# Patient Record
Sex: MALE | Race: WHITE | Employment: FULL TIME | URBAN - METROPOLITAN AREA
[De-identification: names, ages, dates, MRNs, and addresses within clinical notes are randomized per-mention and may not be internally consistent; named-entity substitution may affect disease eponyms.]

---

## 2023-03-07 ENCOUNTER — HOSPITAL ENCOUNTER (INPATIENT)
Age: 63
LOS: 3 days | Discharge: HOME | End: 2023-03-10
Attending: EMERGENCY MEDICINE | Admitting: SURGERY
Payer: COMMERCIAL

## 2023-03-07 ENCOUNTER — ANESTHESIA (OUTPATIENT)
Dept: OPERATING ROOM | Age: 63
End: 2023-03-07
Payer: COMMERCIAL

## 2023-03-07 ENCOUNTER — APPOINTMENT (OUTPATIENT)
Dept: GENERAL RADIOLOGY | Age: 63
End: 2023-03-07
Payer: COMMERCIAL

## 2023-03-07 ENCOUNTER — HOSPITAL ENCOUNTER (OUTPATIENT)
Age: 63
Discharge: HOME | End: 2023-03-07
Attending: SURGERY | Admitting: SURGERY

## 2023-03-07 ENCOUNTER — APPOINTMENT (OUTPATIENT)
Dept: CT IMAGING | Age: 63
End: 2023-03-07
Payer: COMMERCIAL

## 2023-03-07 ENCOUNTER — APPOINTMENT (OUTPATIENT)
Dept: CARDIAC CATH/INVASIVE PROCEDURES | Age: 63
End: 2023-03-07
Payer: COMMERCIAL

## 2023-03-07 ENCOUNTER — ANESTHESIA EVENT (OUTPATIENT)
Dept: OPERATING ROOM | Age: 63
End: 2023-03-07
Payer: COMMERCIAL

## 2023-03-07 DIAGNOSIS — S82.892M: ICD-10-CM

## 2023-03-07 DIAGNOSIS — S82.892B TYPE I OR II OPEN FRACTURE OF LEFT ANKLE, INITIAL ENCOUNTER: Primary | ICD-10-CM

## 2023-03-07 DIAGNOSIS — M89.9 LYTIC LESION OF BONE ON X-RAY: ICD-10-CM

## 2023-03-07 PROBLEM — R00.1 BRADYCARDIA: Status: ACTIVE | Noted: 2023-03-07

## 2023-03-07 LAB
25(OH)D3 SERPL-MCNC: 9.2 NG/ML
ABO/RH: NORMAL
ALLEN TEST: ABNORMAL
ANION GAP SERPL CALCULATED.3IONS-SCNC: 10 MMOL/L (ref 9–17)
ANTIBODY SCREEN: NEGATIVE
ARM BAND NUMBER: NORMAL
BLOOD BANK SPECIMEN: ABNORMAL
BUN SERPL-MCNC: 11 MG/DL (ref 8–23)
CARBOXYHEMOGLOBIN: 1.4 % (ref 0–5)
CARBOXYHEMOGLOBIN: ABNORMAL %
CHLORIDE SERPL-SCNC: 108 MMOL/L (ref 98–107)
CO2 SERPL-SCNC: 22 MMOL/L (ref 20–31)
CREAT SERPL-MCNC: 0.99 MG/DL (ref 0.7–1.2)
EST. AVERAGE GLUCOSE BLD GHB EST-MCNC: 126 MG/DL
ETHANOL PERCENT: <0.01 %
ETHANOL: <10 MG/DL
EXPIRATION DATE: NORMAL
FIO2: ABNORMAL
FIO2: ABNORMAL
GFR SERPL CREATININE-BSD FRML MDRD: >60 ML/MIN/1.73M2
GLUCOSE SERPL-MCNC: 130 MG/DL (ref 70–99)
HBA1C MFR BLD: 6 % (ref 4–6)
HCG QUALITATIVE: ABNORMAL
HCO3 VENOUS: 22.9 MMOL/L (ref 24–30)
HCO3 VENOUS: ABNORMAL MMOL/L (ref 24–30)
HCT VFR BLD AUTO: 45.4 % (ref 40.7–50.3)
HGB BLD-MCNC: 14.9 G/DL (ref 13–17)
INR PPP: 1
MCH RBC QN AUTO: 27.6 PG (ref 25.2–33.5)
MCHC RBC AUTO-ENTMCNC: 32.8 G/DL (ref 28.4–34.8)
MCV RBC AUTO: 84.1 FL (ref 82.6–102.9)
METHEMOGLOBIN: ABNORMAL %
MODE: ABNORMAL
NEGATIVE BASE EXCESS, VEN: 2.9 MMOL/L (ref 0–2)
NEGATIVE BASE EXCESS, VEN: ABNORMAL MMOL/L (ref 0–2)
NOTIFICATION TIME: ABNORMAL
NOTIFICATION: ABNORMAL
NRBC AUTOMATED: 0 PER 100 WBC
O2 DEVICE/FLOW/%: ABNORMAL
O2 SAT, VEN: 64.6 % (ref 60–85)
O2 SAT, VEN: ABNORMAL %
OXYHEMOGLOBIN: ABNORMAL % (ref 95–98)
PARTIAL THROMBOPLASTIN TIME: 18.2 SEC (ref 20.5–30.5)
PATIENT TEMP: 37
PATIENT TEMP: ABNORMAL
PCO2, VEN, TEMP ADJ: ABNORMAL MMHG (ref 39–55)
PCO2, VEN: 45.9 MM HG (ref 39–55)
PCO2, VEN: ABNORMAL MM HG (ref 39–55)
PDW BLD-RTO: 13 % (ref 11.8–14.4)
PEEP/CPAP: ABNORMAL
PH VENOUS: 7.32 (ref 7.32–7.42)
PH VENOUS: ABNORMAL (ref 7.32–7.42)
PH, VEN, TEMP ADJ: ABNORMAL (ref 7.32–7.42)
PLATELET # BLD AUTO: 221 K/UL (ref 138–453)
PMV BLD AUTO: 11.1 FL (ref 8.1–13.5)
PO2, VEN, TEMP ADJ: ABNORMAL MMHG (ref 30–50)
PO2, VEN: 36 MM HG (ref 30–50)
PO2, VEN: ABNORMAL MM HG (ref 30–50)
POSITIVE BASE EXCESS, VEN: ABNORMAL MMOL/L (ref 0–2)
POTASSIUM SERPL-SCNC: 3.3 MMOL/L (ref 3.7–5.3)
PROTHROMBIN TIME: 10.9 SEC (ref 9.1–12.3)
PSV: ABNORMAL
PT. POSITION: ABNORMAL
RBC # BLD: 5.4 M/UL (ref 4.21–5.77)
REASON FOR REJECTION: NORMAL
RESPIRATORY RATE: ABNORMAL
SAMPLE SITE: ABNORMAL
SET RATE: ABNORMAL
SODIUM SERPL-SCNC: 140 MMOL/L (ref 135–144)
T4 FREE SERPL-MCNC: 1.01 NG/DL (ref 0.93–1.7)
TEXT FOR RESPIRATORY: ABNORMAL
TOTAL HB: ABNORMAL G/DL (ref 12–16)
TOTAL RATE: ABNORMAL
TROPONIN I SERPL DL<=0.01 NG/ML-MCNC: 14 NG/L (ref 0–22)
TSH SERPL-ACNC: 1.83 UIU/ML (ref 0.3–5)
VT: ABNORMAL
WBC # BLD AUTO: 5.5 K/UL (ref 3.5–11.3)
ZZ NTE CLEAN UP: ORDERED TEST: NORMAL
ZZ NTE WITH NAME CLEAN UP: SPECIMEN SOURCE: NORMAL

## 2023-03-07 PROCEDURE — 6360000002 HC RX W HCPCS: Performed by: STUDENT IN AN ORGANIZED HEALTH CARE EDUCATION/TRAINING PROGRAM

## 2023-03-07 PROCEDURE — 6360000002 HC RX W HCPCS: Performed by: ANESTHESIOLOGY

## 2023-03-07 PROCEDURE — C1894 INTRO/SHEATH, NON-LASER: HCPCS | Performed by: STUDENT IN AN ORGANIZED HEALTH CARE EDUCATION/TRAINING PROGRAM

## 2023-03-07 PROCEDURE — 80051 ELECTROLYTE PANEL: CPT

## 2023-03-07 PROCEDURE — 2580000003 HC RX 258: Performed by: STUDENT IN AN ORGANIZED HEALTH CARE EDUCATION/TRAINING PROGRAM

## 2023-03-07 PROCEDURE — 2580000003 HC RX 258

## 2023-03-07 PROCEDURE — 86901 BLOOD TYPING SEROLOGIC RH(D): CPT

## 2023-03-07 PROCEDURE — 73610 X-RAY EXAM OF ANKLE: CPT

## 2023-03-07 PROCEDURE — C1892 INTRO/SHEATH,FIXED,PEEL-AWAY: HCPCS

## 2023-03-07 PROCEDURE — 6370000000 HC RX 637 (ALT 250 FOR IP)

## 2023-03-07 PROCEDURE — 96374 THER/PROPH/DIAG INJ IV PUSH: CPT

## 2023-03-07 PROCEDURE — 27829 TREAT LOWER LEG JOINT: CPT | Performed by: STUDENT IN AN ORGANIZED HEALTH CARE EDUCATION/TRAINING PROGRAM

## 2023-03-07 PROCEDURE — 82947 ASSAY GLUCOSE BLOOD QUANT: CPT

## 2023-03-07 PROCEDURE — 90471 IMMUNIZATION ADMIN: CPT | Performed by: STUDENT IN AN ORGANIZED HEALTH CARE EDUCATION/TRAINING PROGRAM

## 2023-03-07 PROCEDURE — 0SSG04Z REPOSITION LEFT ANKLE JOINT WITH INTERNAL FIXATION DEVICE, OPEN APPROACH: ICD-10-PCS | Performed by: STUDENT IN AN ORGANIZED HEALTH CARE EDUCATION/TRAINING PROGRAM

## 2023-03-07 PROCEDURE — 86850 RBC ANTIBODY SCREEN: CPT

## 2023-03-07 PROCEDURE — 96375 TX/PRO/DX INJ NEW DRUG ADDON: CPT

## 2023-03-07 PROCEDURE — 6360000002 HC RX W HCPCS

## 2023-03-07 PROCEDURE — 82565 ASSAY OF CREATININE: CPT

## 2023-03-07 PROCEDURE — 99285 EMERGENCY DEPT VISIT HI MDM: CPT

## 2023-03-07 PROCEDURE — 13121 CMPLX RPR S/A/L 2.6-7.5 CM: CPT | Performed by: STUDENT IN AN ORGANIZED HEALTH CARE EDUCATION/TRAINING PROGRAM

## 2023-03-07 PROCEDURE — 73600 X-RAY EXAM OF ANKLE: CPT

## 2023-03-07 PROCEDURE — 85730 THROMBOPLASTIN TIME PARTIAL: CPT

## 2023-03-07 PROCEDURE — 5A1223Z PERFORMANCE OF CARDIAC PACING, CONTINUOUS: ICD-10-PCS | Performed by: INTERNAL MEDICINE

## 2023-03-07 PROCEDURE — 10700986 HC MISC INTRODUCER/SHEATH

## 2023-03-07 PROCEDURE — 85610 PROTHROMBIN TIME: CPT

## 2023-03-07 PROCEDURE — 36415 COLL VENOUS BLD VENIPUNCTURE: CPT

## 2023-03-07 PROCEDURE — 10700986 HC MISC INTRODUCER PEEL-AWAY

## 2023-03-07 PROCEDURE — 3700000001 HC ADD 15 MINUTES (ANESTHESIA): Performed by: STUDENT IN AN ORGANIZED HEALTH CARE EDUCATION/TRAINING PROGRAM

## 2023-03-07 PROCEDURE — C1892 INTRO/SHEATH,FIXED,PEEL-AWAY: HCPCS | Performed by: STUDENT IN AN ORGANIZED HEALTH CARE EDUCATION/TRAINING PROGRAM

## 2023-03-07 PROCEDURE — 2000000000 HC ICU R&B

## 2023-03-07 PROCEDURE — 6370000000 HC RX 637 (ALT 250 FOR IP): Performed by: STUDENT IN AN ORGANIZED HEALTH CARE EDUCATION/TRAINING PROGRAM

## 2023-03-07 PROCEDURE — 3209999900 CT THORACIC SPINE TRAUMA RECONSTRUCTION

## 2023-03-07 PROCEDURE — 84703 CHORIONIC GONADOTROPIN ASSAY: CPT

## 2023-03-07 PROCEDURE — 0QSH04Z REPOSITION LEFT TIBIA WITH INTERNAL FIXATION DEVICE, OPEN APPROACH: ICD-10-PCS | Performed by: STUDENT IN AN ORGANIZED HEALTH CARE EDUCATION/TRAINING PROGRAM

## 2023-03-07 PROCEDURE — 90715 TDAP VACCINE 7 YRS/> IM: CPT | Performed by: STUDENT IN AN ORGANIZED HEALTH CARE EDUCATION/TRAINING PROGRAM

## 2023-03-07 PROCEDURE — C1894 INTRO/SHEATH, NON-LASER: HCPCS

## 2023-03-07 PROCEDURE — 2709999900 HC NON-CHARGEABLE SUPPLY: Performed by: STUDENT IN AN ORGANIZED HEALTH CARE EDUCATION/TRAINING PROGRAM

## 2023-03-07 PROCEDURE — 2709999900 HC NON-CHARGEABLE SUPPLY

## 2023-03-07 PROCEDURE — 83036 HEMOGLOBIN GLYCOSYLATED A1C: CPT

## 2023-03-07 PROCEDURE — 3600000014 HC SURGERY LEVEL 4 ADDTL 15MIN: Performed by: STUDENT IN AN ORGANIZED HEALTH CARE EDUCATION/TRAINING PROGRAM

## 2023-03-07 PROCEDURE — 86900 BLOOD TYPING SEROLOGIC ABO: CPT

## 2023-03-07 PROCEDURE — 3600000004 HC SURGERY LEVEL 4 BASE: Performed by: STUDENT IN AN ORGANIZED HEALTH CARE EDUCATION/TRAINING PROGRAM

## 2023-03-07 PROCEDURE — 82805 BLOOD GASES W/O2 SATURATION: CPT

## 2023-03-07 PROCEDURE — 99253 IP/OBS CNSLTJ NEW/EST LOW 45: CPT | Performed by: STUDENT IN AN ORGANIZED HEALTH CARE EDUCATION/TRAINING PROGRAM

## 2023-03-07 PROCEDURE — 85027 COMPLETE CBC AUTOMATED: CPT

## 2023-03-07 PROCEDURE — 84484 ASSAY OF TROPONIN QUANT: CPT

## 2023-03-07 PROCEDURE — 72125 CT NECK SPINE W/O DYE: CPT

## 2023-03-07 PROCEDURE — 2580000003 HC RX 258: Performed by: NURSE ANESTHETIST, CERTIFIED REGISTERED

## 2023-03-07 PROCEDURE — 10700986 HC NON-CHARGEABLE SUPPLY

## 2023-03-07 PROCEDURE — 3209999900 CT LUMBAR SPINE TRAUMA RECONSTRUCTION

## 2023-03-07 PROCEDURE — 3700000000 HC ANESTHESIA ATTENDED CARE: Performed by: STUDENT IN AN ORGANIZED HEALTH CARE EDUCATION/TRAINING PROGRAM

## 2023-03-07 PROCEDURE — 84443 ASSAY THYROID STIM HORMONE: CPT

## 2023-03-07 PROCEDURE — 93005 ELECTROCARDIOGRAM TRACING: CPT | Performed by: STUDENT IN AN ORGANIZED HEALTH CARE EDUCATION/TRAINING PROGRAM

## 2023-03-07 PROCEDURE — 77071 MNL APPL STRS JT RADIOGRAPHY: CPT | Performed by: STUDENT IN AN ORGANIZED HEALTH CARE EDUCATION/TRAINING PROGRAM

## 2023-03-07 PROCEDURE — 84520 ASSAY OF UREA NITROGEN: CPT

## 2023-03-07 PROCEDURE — C1713 ANCHOR/SCREW BN/BN,TIS/BN: HCPCS | Performed by: STUDENT IN AN ORGANIZED HEALTH CARE EDUCATION/TRAINING PROGRAM

## 2023-03-07 PROCEDURE — 3209999900 FLUORO FOR SURGICAL PROCEDURES

## 2023-03-07 PROCEDURE — 11012 DEB SKIN BONE AT FX SITE: CPT | Performed by: STUDENT IN AN ORGANIZED HEALTH CARE EDUCATION/TRAINING PROGRAM

## 2023-03-07 PROCEDURE — 71250 CT THORAX DX C-: CPT

## 2023-03-07 PROCEDURE — 73590 X-RAY EXAM OF LOWER LEG: CPT

## 2023-03-07 PROCEDURE — 27814 TREATMENT OF ANKLE FRACTURE: CPT | Performed by: STUDENT IN AN ORGANIZED HEALTH CARE EDUCATION/TRAINING PROGRAM

## 2023-03-07 PROCEDURE — 6360000002 HC RX W HCPCS: Performed by: NURSE ANESTHETIST, CERTIFIED REGISTERED

## 2023-03-07 PROCEDURE — 70450 CT HEAD/BRAIN W/O DYE: CPT

## 2023-03-07 PROCEDURE — 2720000010 HC SURG SUPPLY STERILE: Performed by: STUDENT IN AN ORGANIZED HEALTH CARE EDUCATION/TRAINING PROGRAM

## 2023-03-07 PROCEDURE — C1769 GUIDE WIRE: HCPCS | Performed by: STUDENT IN AN ORGANIZED HEALTH CARE EDUCATION/TRAINING PROGRAM

## 2023-03-07 PROCEDURE — 84439 ASSAY OF FREE THYROXINE: CPT

## 2023-03-07 PROCEDURE — 33212 INSERT PULSE GEN SNGL LEAD: CPT

## 2023-03-07 PROCEDURE — G0480 DRUG TEST DEF 1-7 CLASSES: HCPCS

## 2023-03-07 PROCEDURE — 2500000003 HC RX 250 WO HCPCS: Performed by: NURSE ANESTHETIST, CERTIFIED REGISTERED

## 2023-03-07 PROCEDURE — 82306 VITAMIN D 25 HYDROXY: CPT

## 2023-03-07 DEVICE — IMPLANTABLE DEVICE: Type: IMPLANTABLE DEVICE | Site: ANKLE | Status: FUNCTIONAL

## 2023-03-07 DEVICE — SYSTEM IMPL S STL KNOTLESS SYNDESMOSIS TIGHTROPE XP: Type: IMPLANTABLE DEVICE | Site: ANKLE | Status: FUNCTIONAL

## 2023-03-07 DEVICE — SCREW BNE L14MM DIA3MM CANC S STL NONLOCKING LO PROF FOR: Type: IMPLANTABLE DEVICE | Site: ANKLE | Status: FUNCTIONAL

## 2023-03-07 DEVICE — SCREW BNE L16MM DIA2.7MM CORT ANK S STL NONLOCKING LO PROF: Type: IMPLANTABLE DEVICE | Site: ANKLE | Status: FUNCTIONAL

## 2023-03-07 RX ORDER — ERGOCALCIFEROL 1.25 MG/1
50000 CAPSULE ORAL WEEKLY
Qty: 8 CAPSULE | Refills: 0 | Status: SHIPPED | OUTPATIENT
Start: 2023-03-07 | End: 2023-04-26

## 2023-03-07 RX ORDER — TOBRAMYCIN 1.2 G/30ML
INJECTION, POWDER, LYOPHILIZED, FOR SOLUTION INTRAVENOUS PRN
Status: DISCONTINUED | OUTPATIENT
Start: 2023-03-07 | End: 2023-03-07 | Stop reason: ALTCHOICE

## 2023-03-07 RX ORDER — ONDANSETRON 2 MG/ML
INJECTION INTRAMUSCULAR; INTRAVENOUS PRN
Status: DISCONTINUED | OUTPATIENT
Start: 2023-03-07 | End: 2023-03-07 | Stop reason: SDUPTHER

## 2023-03-07 RX ORDER — FENTANYL CITRATE 50 UG/ML
50 INJECTION, SOLUTION INTRAMUSCULAR; INTRAVENOUS
Status: DISCONTINUED | OUTPATIENT
Start: 2023-03-07 | End: 2023-03-08

## 2023-03-07 RX ORDER — PROPOFOL 10 MG/ML
INJECTION, EMULSION INTRAVENOUS PRN
Status: DISCONTINUED | OUTPATIENT
Start: 2023-03-07 | End: 2023-03-07 | Stop reason: SDUPTHER

## 2023-03-07 RX ORDER — SODIUM CHLORIDE, SODIUM LACTATE, POTASSIUM CHLORIDE, CALCIUM CHLORIDE 600; 310; 30; 20 MG/100ML; MG/100ML; MG/100ML; MG/100ML
INJECTION, SOLUTION INTRAVENOUS CONTINUOUS
Status: DISCONTINUED | OUTPATIENT
Start: 2023-03-07 | End: 2023-03-07

## 2023-03-07 RX ORDER — SODIUM CHLORIDE 0.9 % (FLUSH) 0.9 %
5-40 SYRINGE (ML) INJECTION PRN
Status: DISCONTINUED | OUTPATIENT
Start: 2023-03-07 | End: 2023-03-07

## 2023-03-07 RX ORDER — HYDRALAZINE HYDROCHLORIDE 20 MG/ML
10 INJECTION INTRAMUSCULAR; INTRAVENOUS
Status: DISCONTINUED | OUTPATIENT
Start: 2023-03-07 | End: 2023-03-07

## 2023-03-07 RX ORDER — OXYCODONE HYDROCHLORIDE 5 MG/1
2.5 TABLET ORAL EVERY 6 HOURS PRN
Status: DISCONTINUED | OUTPATIENT
Start: 2023-03-07 | End: 2023-03-07

## 2023-03-07 RX ORDER — VANCOMYCIN HYDROCHLORIDE 1 G/20ML
INJECTION, POWDER, LYOPHILIZED, FOR SOLUTION INTRAVENOUS PRN
Status: DISCONTINUED | OUTPATIENT
Start: 2023-03-07 | End: 2023-03-07 | Stop reason: ALTCHOICE

## 2023-03-07 RX ORDER — TRANEXAMIC ACID 10 MG/ML
INJECTION, SOLUTION INTRAVENOUS PRN
Status: DISCONTINUED | OUTPATIENT
Start: 2023-03-07 | End: 2023-03-07 | Stop reason: SDUPTHER

## 2023-03-07 RX ORDER — 0.9 % SODIUM CHLORIDE 0.9 %
1000 INTRAVENOUS SOLUTION INTRAVENOUS ONCE
Status: COMPLETED | OUTPATIENT
Start: 2023-03-07 | End: 2023-03-07

## 2023-03-07 RX ORDER — LIDOCAINE HYDROCHLORIDE 10 MG/ML
INJECTION, SOLUTION EPIDURAL; INFILTRATION; INTRACAUDAL; PERINEURAL PRN
Status: DISCONTINUED | OUTPATIENT
Start: 2023-03-07 | End: 2023-03-07 | Stop reason: SDUPTHER

## 2023-03-07 RX ORDER — ACETAMINOPHEN 500 MG
1000 TABLET ORAL EVERY 8 HOURS SCHEDULED
Status: DISCONTINUED | OUTPATIENT
Start: 2023-03-07 | End: 2023-03-07

## 2023-03-07 RX ORDER — ONDANSETRON 2 MG/ML
INJECTION INTRAMUSCULAR; INTRAVENOUS
Status: COMPLETED
Start: 2023-03-07 | End: 2023-03-07

## 2023-03-07 RX ORDER — SODIUM CHLORIDE, SODIUM LACTATE, POTASSIUM CHLORIDE, CALCIUM CHLORIDE 600; 310; 30; 20 MG/100ML; MG/100ML; MG/100ML; MG/100ML
INJECTION, SOLUTION INTRAVENOUS CONTINUOUS
Status: DISCONTINUED | OUTPATIENT
Start: 2023-03-08 | End: 2023-03-08

## 2023-03-07 RX ORDER — FENTANYL CITRATE 50 UG/ML
50 INJECTION, SOLUTION INTRAMUSCULAR; INTRAVENOUS ONCE
Status: COMPLETED | OUTPATIENT
Start: 2023-03-07 | End: 2023-03-07

## 2023-03-07 RX ORDER — DEXAMETHASONE SODIUM PHOSPHATE 10 MG/ML
INJECTION INTRAMUSCULAR; INTRAVENOUS PRN
Status: DISCONTINUED | OUTPATIENT
Start: 2023-03-07 | End: 2023-03-07 | Stop reason: SDUPTHER

## 2023-03-07 RX ORDER — ONDANSETRON 2 MG/ML
4 INJECTION INTRAMUSCULAR; INTRAVENOUS ONCE
Status: COMPLETED | OUTPATIENT
Start: 2023-03-07 | End: 2023-03-07

## 2023-03-07 RX ORDER — MAGNESIUM HYDROXIDE 1200 MG/15ML
LIQUID ORAL CONTINUOUS PRN
Status: COMPLETED | OUTPATIENT
Start: 2023-03-07 | End: 2023-03-07

## 2023-03-07 RX ORDER — SODIUM CHLORIDE 0.9 % (FLUSH) 0.9 %
5-40 SYRINGE (ML) INJECTION EVERY 12 HOURS SCHEDULED
Status: DISCONTINUED | OUTPATIENT
Start: 2023-03-07 | End: 2023-03-07

## 2023-03-07 RX ORDER — SODIUM CHLORIDE 9 MG/ML
INJECTION, SOLUTION INTRAVENOUS PRN
Status: DISCONTINUED | OUTPATIENT
Start: 2023-03-07 | End: 2023-03-10 | Stop reason: HOSPADM

## 2023-03-07 RX ORDER — PHENYLEPHRINE HCL IN 0.9% NACL 1 MG/10 ML
SYRINGE (ML) INTRAVENOUS PRN
Status: DISCONTINUED | OUTPATIENT
Start: 2023-03-07 | End: 2023-03-07 | Stop reason: SDUPTHER

## 2023-03-07 RX ORDER — ONDANSETRON 4 MG/1
4 TABLET, ORALLY DISINTEGRATING ORAL EVERY 8 HOURS PRN
Status: DISCONTINUED | OUTPATIENT
Start: 2023-03-07 | End: 2023-03-10 | Stop reason: HOSPADM

## 2023-03-07 RX ORDER — SODIUM CHLORIDE 0.9 % (FLUSH) 0.9 %
5-40 SYRINGE (ML) INJECTION PRN
Status: DISCONTINUED | OUTPATIENT
Start: 2023-03-07 | End: 2023-03-10 | Stop reason: HOSPADM

## 2023-03-07 RX ORDER — GLYCOPYRROLATE 0.2 MG/ML
INJECTION INTRAMUSCULAR; INTRAVENOUS PRN
Status: DISCONTINUED | OUTPATIENT
Start: 2023-03-07 | End: 2023-03-07 | Stop reason: SDUPTHER

## 2023-03-07 RX ORDER — ROCURONIUM BROMIDE 10 MG/ML
INJECTION, SOLUTION INTRAVENOUS PRN
Status: DISCONTINUED | OUTPATIENT
Start: 2023-03-07 | End: 2023-03-07 | Stop reason: SDUPTHER

## 2023-03-07 RX ORDER — DROPERIDOL 2.5 MG/ML
0.62 INJECTION, SOLUTION INTRAMUSCULAR; INTRAVENOUS
Status: DISCONTINUED | OUTPATIENT
Start: 2023-03-07 | End: 2023-03-07

## 2023-03-07 RX ORDER — POLYETHYLENE GLYCOL 3350 17 G/17G
17 POWDER, FOR SOLUTION ORAL DAILY
Status: DISCONTINUED | OUTPATIENT
Start: 2023-03-07 | End: 2023-03-10 | Stop reason: HOSPADM

## 2023-03-07 RX ORDER — METOCLOPRAMIDE HYDROCHLORIDE 5 MG/ML
10 INJECTION INTRAMUSCULAR; INTRAVENOUS
Status: DISCONTINUED | OUTPATIENT
Start: 2023-03-07 | End: 2023-03-07

## 2023-03-07 RX ORDER — DIPHENHYDRAMINE HYDROCHLORIDE 50 MG/ML
12.5 INJECTION INTRAMUSCULAR; INTRAVENOUS
Status: DISCONTINUED | OUTPATIENT
Start: 2023-03-07 | End: 2023-03-07

## 2023-03-07 RX ORDER — LABETALOL HYDROCHLORIDE 5 MG/ML
10 INJECTION, SOLUTION INTRAVENOUS EVERY 6 HOURS PRN
Status: DISCONTINUED | OUTPATIENT
Start: 2023-03-07 | End: 2023-03-08

## 2023-03-07 RX ORDER — ONDANSETRON 2 MG/ML
4 INJECTION INTRAMUSCULAR; INTRAVENOUS EVERY 6 HOURS PRN
Status: DISCONTINUED | OUTPATIENT
Start: 2023-03-07 | End: 2023-03-10 | Stop reason: HOSPADM

## 2023-03-07 RX ORDER — SUCCINYLCHOLINE/SOD CL,ISO/PF 100 MG/5ML
SYRINGE (ML) INTRAVENOUS PRN
Status: DISCONTINUED | OUTPATIENT
Start: 2023-03-07 | End: 2023-03-07 | Stop reason: SDUPTHER

## 2023-03-07 RX ORDER — FENTANYL CITRATE 50 UG/ML
INJECTION, SOLUTION INTRAMUSCULAR; INTRAVENOUS PRN
Status: DISCONTINUED | OUTPATIENT
Start: 2023-03-07 | End: 2023-03-07 | Stop reason: SDUPTHER

## 2023-03-07 RX ORDER — SODIUM CHLORIDE 9 MG/ML
25 INJECTION, SOLUTION INTRAVENOUS PRN
Status: DISCONTINUED | OUTPATIENT
Start: 2023-03-07 | End: 2023-03-07

## 2023-03-07 RX ORDER — NEOSTIGMINE METHYLSULFATE 5 MG/5 ML
SYRINGE (ML) INTRAVENOUS PRN
Status: DISCONTINUED | OUTPATIENT
Start: 2023-03-07 | End: 2023-03-07 | Stop reason: SDUPTHER

## 2023-03-07 RX ORDER — SENNA PLUS 8.6 MG/1
1 TABLET ORAL DAILY PRN
Status: DISCONTINUED | OUTPATIENT
Start: 2023-03-07 | End: 2023-03-08

## 2023-03-07 RX ORDER — GABAPENTIN 100 MG/1
100 CAPSULE ORAL EVERY 8 HOURS
Status: DISCONTINUED | OUTPATIENT
Start: 2023-03-07 | End: 2023-03-10 | Stop reason: HOSPADM

## 2023-03-07 RX ORDER — SODIUM CHLORIDE 0.9 % (FLUSH) 0.9 %
5-40 SYRINGE (ML) INJECTION EVERY 12 HOURS SCHEDULED
Status: DISCONTINUED | OUTPATIENT
Start: 2023-03-07 | End: 2023-03-10 | Stop reason: HOSPADM

## 2023-03-07 RX ORDER — OXYCODONE HYDROCHLORIDE 5 MG/1
5 TABLET ORAL EVERY 6 HOURS PRN
Status: DISCONTINUED | OUTPATIENT
Start: 2023-03-07 | End: 2023-03-10 | Stop reason: HOSPADM

## 2023-03-07 RX ADMIN — FENTANYL CITRATE 25 MCG: 50 INJECTION, SOLUTION INTRAMUSCULAR; INTRAVENOUS at 17:38

## 2023-03-07 RX ADMIN — LIDOCAINE HYDROCHLORIDE 50 MG: 10 INJECTION, SOLUTION EPIDURAL; INFILTRATION; INTRACAUDAL; PERINEURAL at 15:15

## 2023-03-07 RX ADMIN — TRANEXAMIC ACID 1 G: 10 INJECTION, SOLUTION INTRAVENOUS at 15:38

## 2023-03-07 RX ADMIN — FENTANYL CITRATE 50 MCG: 50 INJECTION, SOLUTION INTRAMUSCULAR; INTRAVENOUS at 10:00

## 2023-03-07 RX ADMIN — Medication 100 MCG: at 15:49

## 2023-03-07 RX ADMIN — SENNOSIDES 8.6 MG: 8.6 TABLET, COATED ORAL at 19:55

## 2023-03-07 RX ADMIN — Medication 2000 MG: at 10:01

## 2023-03-07 RX ADMIN — GABAPENTIN 100 MG: 100 CAPSULE ORAL at 19:55

## 2023-03-07 RX ADMIN — DEXAMETHASONE SODIUM PHOSPHATE 5 MG: 10 INJECTION INTRAMUSCULAR; INTRAVENOUS at 15:41

## 2023-03-07 RX ADMIN — Medication 100 MCG: at 16:01

## 2023-03-07 RX ADMIN — FENTANYL CITRATE 25 MCG: 50 INJECTION, SOLUTION INTRAMUSCULAR; INTRAVENOUS at 17:20

## 2023-03-07 RX ADMIN — ACETAMINOPHEN 1000 MG: 500 TABLET ORAL at 13:43

## 2023-03-07 RX ADMIN — GENTAMICIN SULFATE 500 MG: 40 INJECTION, SOLUTION INTRAMUSCULAR; INTRAVENOUS at 11:08

## 2023-03-07 RX ADMIN — TETANUS TOXOID, REDUCED DIPHTHERIA TOXOID AND ACELLULAR PERTUSSIS VACCINE, ADSORBED 0.5 ML: 5; 2.5; 8; 8; 2.5 SUSPENSION INTRAMUSCULAR at 10:14

## 2023-03-07 RX ADMIN — Medication 2 MG: at 17:19

## 2023-03-07 RX ADMIN — Medication 100 MCG: at 16:19

## 2023-03-07 RX ADMIN — Medication 100 MG: at 15:18

## 2023-03-07 RX ADMIN — Medication 100 MCG: at 16:08

## 2023-03-07 RX ADMIN — SODIUM CHLORIDE, POTASSIUM CHLORIDE, SODIUM LACTATE AND CALCIUM CHLORIDE: 600; 310; 30; 20 INJECTION, SOLUTION INTRAVENOUS at 13:42

## 2023-03-07 RX ADMIN — FENTANYL CITRATE 100 MCG: 50 INJECTION, SOLUTION INTRAMUSCULAR; INTRAVENOUS at 15:12

## 2023-03-07 RX ADMIN — OXYCODONE HYDROCHLORIDE 5 MG: 5 TABLET ORAL at 19:54

## 2023-03-07 RX ADMIN — Medication 100 MCG: at 16:40

## 2023-03-07 RX ADMIN — GABAPENTIN 100 MG: 100 CAPSULE ORAL at 13:43

## 2023-03-07 RX ADMIN — Medication 100 MCG: at 15:53

## 2023-03-07 RX ADMIN — ONDANSETRON 4 MG: 2 INJECTION INTRAMUSCULAR; INTRAVENOUS at 17:09

## 2023-03-07 RX ADMIN — SODIUM CHLORIDE 1000 ML: 9 INJECTION, SOLUTION INTRAVENOUS at 10:01

## 2023-03-07 RX ADMIN — FENTANYL CITRATE 50 MCG: 50 INJECTION, SOLUTION INTRAMUSCULAR; INTRAVENOUS at 11:31

## 2023-03-07 RX ADMIN — Medication 2000 MG: at 22:04

## 2023-03-07 RX ADMIN — GLYCOPYRROLATE 0.2 MG: 0.2 INJECTION INTRAMUSCULAR; INTRAVENOUS at 17:19

## 2023-03-07 RX ADMIN — ONDANSETRON 4 MG: 2 INJECTION INTRAMUSCULAR; INTRAVENOUS at 10:01

## 2023-03-07 RX ADMIN — PHENYLEPHRINE HYDROCHLORIDE 50 MCG/MIN: 10 INJECTION INTRAVENOUS at 16:47

## 2023-03-07 RX ADMIN — PROPOFOL 200 MG: 10 INJECTION, EMULSION INTRAVENOUS at 15:17

## 2023-03-07 RX ADMIN — ROCURONIUM BROMIDE 20 MG: 10 INJECTION, SOLUTION INTRAVENOUS at 15:44

## 2023-03-07 RX ADMIN — ROCURONIUM BROMIDE 20 MG: 10 INJECTION, SOLUTION INTRAVENOUS at 16:21

## 2023-03-07 RX ADMIN — Medication 3 MG: at 17:13

## 2023-03-07 RX ADMIN — GLYCOPYRROLATE 0.6 MG: 0.2 INJECTION INTRAMUSCULAR; INTRAVENOUS at 17:13

## 2023-03-07 RX ADMIN — Medication 2000 MG: at 15:33

## 2023-03-07 RX ADMIN — FENTANYL CITRATE 25 MCG: 50 INJECTION, SOLUTION INTRAMUSCULAR; INTRAVENOUS at 17:56

## 2023-03-07 RX ADMIN — HYDROMORPHONE HYDROCHLORIDE 0.5 MG: 1 INJECTION, SOLUTION INTRAMUSCULAR; INTRAVENOUS; SUBCUTANEOUS at 18:15

## 2023-03-07 RX ADMIN — HYDROMORPHONE HYDROCHLORIDE 0.5 MG: 1 INJECTION, SOLUTION INTRAMUSCULAR; INTRAVENOUS; SUBCUTANEOUS at 18:21

## 2023-03-07 RX ADMIN — EPOETIN ALFA-EPBX 20000 UNITS: 20000 INJECTION, SOLUTION INTRAVENOUS; SUBCUTANEOUS at 18:39

## 2023-03-07 RX ADMIN — FENTANYL CITRATE 25 MCG: 50 INJECTION, SOLUTION INTRAMUSCULAR; INTRAVENOUS at 17:33

## 2023-03-07 RX ADMIN — IRON SUCROSE 100 MG: 20 INJECTION, SOLUTION INTRAVENOUS at 18:37

## 2023-03-07 ASSESSMENT — PAIN DESCRIPTION - PAIN TYPE
TYPE: ACUTE PAIN
TYPE: ACUTE PAIN

## 2023-03-07 ASSESSMENT — PAIN DESCRIPTION - ORIENTATION
ORIENTATION: LEFT

## 2023-03-07 ASSESSMENT — ENCOUNTER SYMPTOMS
COUGH: 0
SORE THROAT: 0
SHORTNESS OF BREATH: 0
ABDOMINAL DISTENTION: 0
ABDOMINAL PAIN: 0
VOMITING: 0
NAUSEA: 0

## 2023-03-07 ASSESSMENT — PAIN DESCRIPTION - DESCRIPTORS
DESCRIPTORS: ACHING

## 2023-03-07 ASSESSMENT — LIFESTYLE VARIABLES
HOW OFTEN DO YOU HAVE A DRINK CONTAINING ALCOHOL: NEVER
HOW MANY STANDARD DRINKS CONTAINING ALCOHOL DO YOU HAVE ON A TYPICAL DAY: PATIENT DOES NOT DRINK

## 2023-03-07 ASSESSMENT — PAIN SCALES - GENERAL
PAINLEVEL_OUTOF10: 8
PAINLEVEL_OUTOF10: 4
PAINLEVEL_OUTOF10: 10
PAINLEVEL_OUTOF10: 5
PAINLEVEL_OUTOF10: 2
PAINLEVEL_OUTOF10: 7

## 2023-03-07 ASSESSMENT — PAIN DESCRIPTION - FREQUENCY
FREQUENCY: CONTINUOUS
FREQUENCY: CONTINUOUS

## 2023-03-07 ASSESSMENT — PAIN DESCRIPTION - LOCATION
LOCATION: ANKLE
LOCATION: LEG
LOCATION: ANKLE
LOCATION: ANKLE

## 2023-03-07 ASSESSMENT — PAIN - FUNCTIONAL ASSESSMENT: PAIN_FUNCTIONAL_ASSESSMENT: 0-10

## 2023-03-07 NOTE — CONSULTS
[facility]    Consult / H&P               Today's Date: 3/7/2023  Patient Name: Monalisa Navas  Date of admission: 3/7/2023  9:36 AM  Patient's age: 57 y.o., 1960  Admission Dx: No admission diagnoses are documented for this encounter. Reason for Consult:  Cardiac evaluation    Requesting Physician: No admitting provider for patient encounter. CHIEF COMPLAINT:  statu post fall     History Obtained From:  patient, electronic medical record    HISTORY OF PRESENT ILLNESS:    This patient 57y.o. years old with no past medical history given on file initially presented to the ER status post fall with left ankle fracture. Patient HR was in 40's. EKG obtained showed 2nd degree Heart block. Cardiology was consulted for same. Labs are pending. Patient is hemodynamically stable, HR is in 40's. No previous cardiac history or family hx of CAD. Past Medical History:   has no past medical history on file. Past Surgical History:   has no past surgical history on file. Home Medications:    Prior to Admission medications    Not on File       Allergies:  Patient has no known allergies. Social History:        Family History: family history is not on file. REVIEW OF SYSTEMS:    Constitutional: there has been no unanticipated weight loss. There's been No change in energy level, No change in activity level. Eyes: No visual changes or diplopia. No scleral icterus. ENT: No Headaches  Cardiovascular: as per HPI  Respiratory: as per HPI  Gastrointestinal: No abdominal pain. No change in bowel or bladder habits. Genitourinary: No dysuria, trouble voiding, or hematuria. Musculoskeletal:  No gait disturbance, No weakness or joint complaints. Integumentary: No rash or pruritis. Neurological: No headache, diplopia, change in muscle strength, numbness or tingling. No change in gait, balance, coordination, mood, affect, memory, mentation, behavior.   Endocrine: No temperature intolerance. No excessive thirst, fluid intake, or urination. No tremor. Hematologic/Lymphatic: No abnormal bruising or bleeding, blood clots or swollen lymph nodes. Allergic/Immunologic: No nasal congestion or hives. PHYSICAL EXAM:      BP (!) 151/80   Pulse 52   Temp 98.2 °F (36.8 °C) (Oral)   Resp 15   Ht 5' 9\" (1.753 m)   Wt 190 lb (86.2 kg)   SpO2 95%   BMI 28.06 kg/m²    Constitutional and General Appearance: alert, cooperative, no distress and appears stated age  HEENT: PERRL, no cervical lymphadenopathy. No masses palpable. Normal oral mucosa  Respiratory:  Resp Auscultation: Good respiratory effort. No for increased work of breathing. On auscultation: clear to auscultation bilaterally  Cardiovascular: The apical impulse is not displaced  regular S1 and S2.  Jugular venous pulsation Normal  Peripheral pulses are symmetrical and full   Abdomen:   No masses or tenderness  Bowel sounds present  Extremities:   No Cyanosis or Clubbing   Lower extremity edema: No   Skin: Warm and dry  Neurological:  Deferred     DATA:    EKG:       ECHO:   not obtained. Stress Test:   not obtained. Cardiac Angiography:   not obtained. Labs:   CBC:   Recent Labs     03/07/23  0959   WBC 5.5   HGB 14.9   HCT 45.4        BMP:   Recent Labs     03/07/23  0959   NA PENDING   K PENDING   CO2 PENDING   BUN PENDING   CREATININE PENDING   LABGLOM PENDING   GLUCOSE PENDING     BNP: No results for input(s): BNP in the last 72 hours. PT/INR:   Recent Labs     03/07/23 0959   PROTIME PENDING   INR PENDING     APTT:  Recent Labs     03/07/23 0959   APTT PENDING     IMPRESSION:    Mobitz type II heart block. New onset   Status post fall with open fracture of left ankle     RECOMMENDATIONS:  Follow up on Echo to r/o structural heart problem as cause of block and to assess EF   Avoid all form of AVN blocking agents.    Use as needed atropine for HR <35  Will start dopamine drip if persistently low and does not respond to atropine. Rest of the management per primary   Will evaluate him for PPM       Final recommendation after discussion with rounding attending       Brianne Kang MD. PGY- 4  Cardiology Fellow  [facility], [facility], [place]          Attending Physician Statement:    I have discussed the care of  Dary Lim , including pertinent history and exam findings, with the Cardiology fellow/resident. I have seen and examined the patient and the key elements of all parts of the encounter have been performed by me. I agree with the assessment, plan and orders as documented by the fellow/resident, after I modified exam findings and plan of treatments, and the final version is my approved version of the assessment. Additional Comments: Seen and examined, agree with above. Trauma to left leg, need urgent surgery, no chest pain or SOB, in 2:1 AV block and HR is 30-40. No dizziness or syncope. Will plan for Temporary pace maker insertion due to severe bradycardia and 2:1 AV block before surgery. Will proceed with open fracture with intermediate to high risk after that. Will evaluate for permanent PM insertion after surgery is done. Detailed discussion with patient, ortho and cath lab staff.

## 2023-03-07 NOTE — CARE COORDINATION
Case Management Assessment  Initial Evaluation    Date/Time of Evaluation: 3/7/2023 10:57 AM  Assessment Completed by: Polo Vega RN    If patient is discharged prior to next notation, then this note serves as note for discharge by case management. Patient Name: Bereket Landaverde                   YOB: 1960  Diagnosis: No admission diagnoses are documented for this encounter. Date / Time: 3/7/2023  9:36 AM    Patient Admission Status: Emergency   Readmission Risk (Low < 19, Mod (19-27), High > 27): No data recorded  Current PCP: No primary care provider on file. PCP verified by CM? (P) Yes (does not have PCP will need list)    Chart Reviewed: Yes      History Provided by: (P) Patient  Patient Orientation: (P) Alert and Oriented    Patient Cognition: (P) Alert    Hospitalization in the last 30 days (Readmission):  No    If yes, Readmission Assessment in CM Navigator will be completed.     Advance Directives:      Code Status: Not on file   Patient's Primary Decision Maker is:  self      Discharge Planning:    Patient lives with: (P) Alone Type of Home: (P) Trailer/Mobile Home  Primary Care Giver: (P) Self  Patient Support Systems include: (P) Family Members   Current Financial resources: (P)  (states has insurance he believes is [place])  Current community resources:    Current services prior to admission: (P) None            Current DME:              Type of Home Care services:  (P) None    ADLS  Prior functional level: (P) Independent in ADLs/IADLs  Current functional level: (P) Independent in ADLs/IADLs    PT AM-PAC:   /24  OT AM-PAC:   /24    Family can provide assistance at DC: (P) Yes  Would you like Case Management to discuss the discharge plan with any other family members/significant others, and if so, who? (P) No  Plans to Return to Present Housing: (P) Yes  Other Identified Issues/Barriers to RETURNING to current housing: none  Potential Assistance needed at discharge: (P) N/A            Potential DME:    Patient expects to discharge to: (P) [facility] for transportation at discharge: (P) Family    Financial    Payor: /     Does insurance require precert for SNF: Yes    Potential assistance Purchasing Medications: (P) Yes (will need meds to beds)  Meds-to-Beds request:      No Pharmacies Listed    Notes:    Factors facilitating achievement of predicted outcomes: Pleasant    Barriers to discharge: Pain    Additional Case Management Notes: will need PCP list,     The Plan for Transition of Care is related to the following treatment goals of No admission diagnoses are documented for this encounter. IF APPLICABLE: The Patient and/or patient representative Eben Holstein and his family were provided with a choice of provider and agrees with the discharge plan. Freedom of choice list with basic dialogue that supports the patient's individualized plan of care/goals and shares the quality data associated with the providers was provided to:     Patient Representative Name:       The Patient and/or Patient Representative Agree with the Discharge Plan?       Siria Schwartz RN  Case Management Department  Ph: [phone]

## 2023-03-07 NOTE — ED NOTES
Dr Louie Bishop at bedside    Pt given ancef and 4 mg of zofran due to feeling nauseous      Alfredo Mills RN  03/07/23 7785

## 2023-03-07 NOTE — BRIEF OP NOTE
Brief Postoperative Note      Patient: Sofia Zuñiga  YOB: 1960  MRN: [id number]    Date of Procedure: 3/7/2023    Pre-Op Diagnosis: Type IIIA open bimalleolar fracture of left ankle    Post-Op Diagnosis: Type IIIA open bimalleolar fracture of left ankle  Left ankle syndesmotic injury       Procedure(s):  1. LEFT ANKLE OPEN REDUCTION INTERNAL FIXATION OF BIMALLEOLAR ANKLE FRACTURE  2. OPEN REDUCTION INTERNAL FIXATION OF LEFT SYNDESMOSIS  3. IRRIGATION AND EXCISIONAL DEBRIDEMENT OF SKIN DOWN TO AND INCLUDING THE BONE OF OPEN LEFT ANKLE FRACTURE  4. STRESS OF LEFT ANKLE UNDER ANESTHESIA WITH INDEPENDENT IMAGING INTERPRETATION  5. CLOSURE OF COMPLEX WOUND MEASURING 6 CM    Surgeon(s):  Daly Cheng DO    Assistant:  Resident: Justyna Milton MD; Letty Valle DO    Anesthesia: General    Estimated Blood Loss (mL): 25 mL    Complications: None. Specimens:   * No specimens in log *    Implants:  Implant Name Type Inv. Item Serial No.  Lot No. LRB No. Used Action   COMPR FT SCRW 3.5 MINI 48MM - [id number]  COMPR FT SCRW 3.5 MINI 48MM  [employer]  Left 1 Implanted   SCREW BONE L50MM DIA3. 5MM MINI TI COMPR FULL THRD DARLINE - [id number]  SCREW BONE L50MM DIA3. 5MM MINI TI COMPR FULL THRD DARLINE  [employer]  Left 1 Implanted   NAIL FIBULA LEFT [id number] - [id number]  NAIL FIBULA LEFT [id number]  [employer] [id number] Left 1 Implanted   SCREW BNE L24MM DIA2.7MM EDDA S STL NONLOCKING LO PROF FOR - [id number]  SCREW BNE L24MM DIA2.7MM EDDA S STL NONLOCKING LO PROF FOR  [employer]  Left 1 Implanted   SCREW BNE L16MM DIA2.7MM EDDA ANK S STL NONLOCKING LO PROF - [id number]  SCREW BNE L16MM DIA2.7MM EDDA ANK S STL NONLOCKING LO PROF  [employer]  Left 1 Implanted   SCREW BNE L14MM DIA3MM CANC S STL NONLOCKING LO PROF FOR - [id number]  SCREW BNE L14MM DIA3MM CANC S STL NONLOCKING LO PROF FOR  [employer]  Left 1 Implanted   SYSTEM IMPL S STL KNOTLESS SYNDESMOSIS TIGHTROPE XP - [id number]  SYSTEM IMPL S STL KNOTLESS SYNDESMOSIS [employer] [id number] Left 1 Implanted         Drains: * No LDAs found *    Findings: See op note for details    Electronically signed by Darlin Caraballo DO on 3/7/2023 at 5:48 PM

## 2023-03-07 NOTE — ED PROVIDER NOTES
[facility]     Emergency Department     Faculty Attestation    I performed a history and physical examination of the patient and discussed management with the resident. I have reviewed and agree with the residents findings including all diagnostic interpretations, and treatment plans as written. Any areas of disagreement are noted on the chart. I was personally present for the key portions of any procedures. I have documented in the chart those procedures where I was not present during the key portions. I have reviewed the emergency nurses triage note. I agree with the chief complaint, past medical history, past surgical history, allergies, medications, social and family history as documented unless otherwise noted below. Documentation of the HPI, Physical Exam and Medical Decision Making performed by scribabi is based on my personal performance of the HPI, PE and MDM. For Physician Assistant/ Nurse Practitioner cases/documentation I have personally evaluated this patient and have completed at least one if not all key elements of the E/M (history, physical exam, and MDM). Additional findings are as noted. Patient presents after a fall at work, and then he sustained an open fracture to his left ankle. He did not hit his head there was no LOC. Patient upon arrival to the emergency room was found to be in Mobitz type II heart block. He denies a history of this in the past.  Heart rate was in the 40s. Patient on exam is awake alert speaking in full sentences, he has open fracture dislocation to his left ankle. But does have a dorsalis pedis pulse that is palpable. C-collar is in place. Soft abdomen without rebound or guarding, no other visible signs of trauma. Heart rate is bradycardic.     EKG shows a type II second-degree heart block, no ST elevations or depressions noted Q waves in the anterior leads with septal infarct of age undetermined. Ventricular rate is 44. Patient with open fracture dislocation of the left foot. We will plan on pain control, 2 g of Ancef to be given and will update tetanus. Type II second-degree heart block, will consult cardiology. Heart rate is stable at this time in the 40s. And blood pressure is not hypotensive. We will continue to monitor  We will plan on trauma consultation and admission.     Madison Schmitz D.O, M.P.H  Attending Emergency Medicine Physician         Madison Schmitz,   03/07/23 1000

## 2023-03-07 NOTE — ED NOTES
Pt arrived in a c collar in place  Left ankle stabilized, bleeding controlled  Pms intact   Will continue plan of care      Jackie Bauer, RN  03/07/23 6107

## 2023-03-07 NOTE — OP NOTE
Date:   3/7/2023  Patient name: Chelly Joyce  Date of admission:  3/7/2023  9:36 AM  MRN:   [id number]  YOB: 1960    Temporary Pacemaker Placement     Operators:  Primary: Dr Maria A Hayden  Assistant: Endy Encarnacion MD    Indications for temporary pacemaker placement: 2:1 AV block      Procedure performed: Temporary Pacemaker Placement     Access: Right femoral Vein. Unable to place right IJ line due to presence of neck collar with no CT neck done    Procedure:  After informed consent was obtained with explanation of the risks and benefits, the Right groin was prepped and draped in sterile fashion. 1% lidocaine was used for local block. A 6 Fr catheter was inserted under ultrasound guidance, a  bipolar pacing catheter was then  advanced into the Cordis. The catheter was advanced and the balloon was inflated. It was further advanced into the right atrium and then the right ventricle to a depth of 62 cm at which point pacing was achieved. The balloon was deflated. Capture was achieved at <1 mA and set at [facility] The patient tolerated the procedure well. The pacing threshold was set at 80 bpm.      Complications: None  Estimated blood loss less than 5 ml     Endy Encarnacion MD  Fellow, [address]        I was present during entire procedure and performed all critical elements of the procedure.     Aris Fung MD

## 2023-03-07 NOTE — DISCHARGE INSTRUCTIONS
Discharge Instructions for Trauma       What to do after you leave the hospital:    Please continue to use your Incentive Spirometer as directed. You can practice 10 deep breaths/hour while awake. Using the [address] will promote the health of your lungs by taking slow, deep breaths in. It is also important in preventing pneumonia or a pneumothorax from developing. General questions or concerns may be called to the trauma nurse line at [phone] and please leave a message. Trauma is a life-threatening condition. Your doctor will want to closely monitor you. Be sure to go to all of your appointments. Orthopaedic Instructions:  -Weight bearing status: Non weight bearing with the left leg  -Do not remove dressings or splint until your post-operative follow up date. It is important that you do not get your splint wet. To avoid this and still maintain proper hygiene, you can wrap a garbage/plastic bag (or similar waterproof material) about the splinted arm/leg and secure it with tape while showering. One should still attempt to keep splint out of water with this method. If your splint were to fall off, it is important that you do not attempt to put it back on. Instead, return to the orthopaedic clinic for reapplication (see number below to call). -Always look for signs of compartment syndrome: pain out of proportion to the injury, pain not controlled with pain medication, numbness in digits, changing of color of digits (paleness).  If these signs occur return to ED immediately for reassessment.  -Always work on toe motion (to non-injured toes) while in splint to decrease swelling.  -Ice (20 minutes on and off 1 hour) and elevate above the level of the heart to reduce swelling and throbbing pain.  -Should urinate within 8 hours of surgery.  -Call the office or come to Emergency Room if signs of infection appear (hot, swollen, red, draining pus, fever)  -Take medications as prescribed.  -Wean off narcotics (percocet/norco) as soon as possible. Do not take tylenol if still taking narcotics.  -Follow up with  Dr. Kari Gonsales  in his office  at least 14 days after surgery (earliest: 3/21/23) . Call [phone] to schedule/confirm. 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

## 2023-03-07 NOTE — ED PROVIDER NOTES
[facility]  Emergency Department Encounter  Emergency Medicine Resident     Pt Name:Irineo Dumont  MRN: [id number]  [place] 1960  Date of evaluation: 3/7/23  PCP:  No primary care provider on file. Note Started: 10:06 AM EST      CHIEF COMPLAINT       Chief Complaint   Patient presents with    Ankle Pain     Left open ankle fx       HISTORY OF PRESENT ILLNESS  (Location/Symptom, Timing/Onset, Context/Setting, Quality, Duration, Modifying Factors, Severity.)      Ellen Helms is a 57 y.o. male who presents with left ankle pain. 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 Patient denies hitting his head or anything else other than his ankle reports he was able to brace with his arms when he fell. Patient reports immediate pain to the left ankle with obvious deformity. Patient reports he called EMS which transported him here. Patient reports he was not feeling dizzy or lightheaded before he fell. Patient denies any loss of consciousness. Patient denies any use of anticoagulants. Patient reports he is on no medications. On initial evaluation patient's heart rate in the 40s and patient reports he is unsure when it normally is but is mentating well with good pressures. PAST MEDICAL / SURGICAL / SOCIAL / FAMILY HISTORY      has no past medical history on file. has no past surgical history on file.       Social History     Socioeconomic History    Marital status: Single     Spouse name: Not on file    Number of children: Not on file    Years of education: Not on file    Highest education level: Not on file   Occupational History    Not on file   Tobacco Use    Smoking status: Not on file    Smokeless tobacco: Not on file   Substance and Sexual Activity    Alcohol use: Not on file    Drug use: Not on file    Sexual activity: Not on file   Other Topics Concern    Not on file Social History Narrative    Not on file     Social Determinants of Health     Financial Resource Strain: Not on file   Food Insecurity: Not on file   Transportation Needs: Not on file   Physical Activity: Not on file   Stress: Not on file   Social Connections: Not on file   Intimate Partner Violence: Not on file   Housing Stability: Not on file       Family History   Family history unknown: Yes       Allergies:  Patient has no known allergies. Home Medications:  Prior to Admission medications    Not on File       REVIEW OF SYSTEMS       Review of Systems   Constitutional:  Negative for activity change, appetite change, chills, fatigue and fever. Respiratory:  Negative for shortness of breath. Cardiovascular:  Negative for chest pain. Gastrointestinal:  Negative for abdominal pain, nausea and vomiting. Skin:  Positive for wound. Neurological:  Negative for dizziness, weakness and light-headedness. PHYSICAL EXAM      INITIAL VITALS:   BP (!) 160/96   Pulse 80   Temp 98.3 °F (36.8 °C)   Resp 20   Ht 5' 9\" (1.753 m)   Wt 191 lb 6.4 oz (86.8 kg)   SpO2 96%   BMI 28.26 kg/m²     Physical Exam  Vitals reviewed. Constitutional:       Appearance: He is not toxic-appearing or diaphoretic. HENT:      Head: Normocephalic and atraumatic. Right Ear: External ear normal.      Left Ear: External ear normal.      Nose: Nose normal.      Mouth/Throat:      Mouth: Mucous membranes are moist.   Eyes:      Conjunctiva/sclera: Conjunctivae normal.   Neck:      Comments: Patient in c-collar  Cardiovascular:      Rate and Rhythm: Bradycardia present. Rhythm irregular. Comments: Dorsalis pedis palpable on left foot  Musculoskeletal:      Cervical back: Neck supple. No tenderness. Neurological:      Mental Status: He is alert. DDX/DIAGNOSTIC RESULTS / EMERGENCY DEPARTMENT COURSE / MDM     Medical Decision Making  Patient is a 73-year-old male who presents due to left ankle pain after fall. Patient without any loss of consciousness or syncope to fall, mechanical fall. Patient with obvious deformity and open ankle fracture. Patient also presenting with bradycardia consistent with type II heart block. Orthopedic surgery, trauma surgery, cardiology will be consulted. Patient is stable despite heart block with good mentation and hypertension. Patient may require presurgical management for heart block but no acute management will be done unless patient becomes unstable. Patient's open fracture was reduced and splinted prior to leaving the ED with physician at bedside. Cardiology then reported that they would place a temporary pacer for the orthopedic surgery. Administered cefazolin, tetanus shot in addition to pain control with fentanyl. Amount and/or Complexity of Data Reviewed  Labs: ordered. Radiology: ordered. ECG/medicine tests: ordered. Risk  Prescription drug management. Decision regarding hospitalization. Emergency major surgery. Critical Care  Total time providing critical care: 30-74 minutes      EKG  EKG Interpretation    Interpreted by emergency department physician    Rhythm: 2 degree AV block - type II  Rate: bradycardia  Axis: normal  Ectopy: none  Conduction: 2nd degree AV block- type II  ST Segments: normal  T Waves: normal  Q Waves: none    Clinical Impression: 2nd degree AV block-mobitz type II    Diamond Ruffin MD     All EKG's are interpreted by the Emergency Department Physician who either signs or Co-signs this chart in the absence of a cardiologist.    EMERGENCY DEPARTMENT COURSE:    ED Course as of 03/07/23 [facility] Mar 07, 2023   1356 Patient presenting after mechanical fall from 3 feet with obvious open left ankle fracture. [HO]   4017 We will consult orthopedics, trauma surgery. Patient incidentally presenting with new onset heart block, will consult cardiology.  [VI]   8406 BQCOF with orthopedics who is requesting conscious sedation, ED physician not comfortable performing at this time as patient with new heart block and patient will require stabilization in the OR. [HO]      ED Course User Index  [HO] Barbara Mauricio MD       PROCEDURES:  none    CONSULTS:  IP CONSULT TO ORTHOPEDIC SURGERY  IP CONSULT TO TRAUMA SURGERY  IP CONSULT TO CARDIOLOGY    CRITICAL CARE:  There was significant risk of life threatening deterioration of patient's condition requiring my direct management. Critical care time 30 minutes, excluding any documented procedures. FINAL IMPRESSION      1. Type I or II open fracture of left ankle, initial encounter          [facility] Admitted 03/07/2023 11:51:11 AM      PATIENT REFERRED TO:  No follow-up provider specified. DISCHARGE MEDICATIONS:  There are no discharge medications for this patient.       Lucrecia Gandhi MD  Emergency Medicine Resident    (Please note that portions of thisnote were completed with a voice recognition program.  Efforts were made to edit the dictations but occasionally words are mis-transcribed.)       Barbara Mauricio MD  Resident  03/07/23 0234

## 2023-03-07 NOTE — PLAN OF CARE
Problem: Discharge Planning  Goal: Discharge to home or other facility with appropriate resources  Outcome: Progressing  Flowsheets (Taken 3/7/2023 1246 by Shonna Payton)  Discharge to home or other facility with appropriate resources:   Identify barriers to discharge with patient and caregiver   Arrange for needed discharge resources and transportation as appropriate     Problem: Pain  Goal: Verbalizes/displays adequate comfort level or baseline comfort level  Outcome: Progressing  Flowsheets (Taken 3/7/2023 1233)  Verbalizes/displays adequate comfort level or baseline comfort level: Encourage patient to monitor pain and request assistance     Problem: ABCDS Injury Assessment  Goal: Absence of physical injury  Outcome: Progressing     Problem: Safety - Adult  Goal: Free from fall injury  Outcome: Progressing

## 2023-03-07 NOTE — PROGRESS NOTES
[facility] CARE DEPARTMENT - [address]     Emergency/Trauma Note    PATIENT NAME: Reggie Rayo    Shift date: 3/7/2023   Shift day: Tuesday   Shift # 1    Room # 30/30   Name: Reggie Rayo            Age: 57 y.o. Gender: male          Confucianist: Temple   Place of Voodoo:     Trauma/Incident type: Adult Trauma Consult  Admit Date & Time: 3/7/2023  9:36 AM    ADVANCE DIRECTIVES IN CHART? No    NAME OF DECISION MAKER:     RELATIONSHIP OF DECISION MAKER TO PATIENT:     PATIENT/EVENT DESCRIPTION:  Reggie Rayo is a 57 y.o. male who arrived via ambulance from work where he injured his ankle. Pt to be admitted to 30/30. SPIRITUAL ASSESSMENT-INTERVENTION-OUTCOME:   encountered patient's brother,Alvin, in hallway outside of pt's room. He said he associates hospitals with death bc of previous experiences and was not sure how long he would stay. Pt's niece, Servando Russell, who works here, came to see pt. Pt said he had no needs from .  provided a supportive presence and offered hospitality. PATIENT BELONGINGS:  With patient    ANY BELONGINGS OF SIGNIFICANT VALUE NOTED:      REGISTRATION STAFF NOTIFIED? No      WHAT IS YOUR SPIRITUAL CARE PLAN FOR THIS PATIENT?:   Chaplains will remain available to offer spiritual and emotional support as needed.      Electronically signed by Macarena Licea on 3/7/2023 at 11:05 AM.  Wu Kolb  [phone]

## 2023-03-07 NOTE — ED NOTES
Report given to car 1 nurse  Due to change of bed status needing to be ICU     Sheryl Maza, RN  03/07/23 9540

## 2023-03-07 NOTE — PROGRESS NOTES
Physical Therapy Cancel Note      DATE: 3/7/2023    NAME: Bereket Landaverde  MRN: [id number]   : 1960      Patient not seen this date for Physical Therapy due to:    Surgery/Procedure: pacemaker; per progress notes, pt will be having ORIF ankle sometime in the near future; check 3/8 for ortho plan.       Electronically signed by John Mcgrath PT on 3/7/2023 at 1:44 PM

## 2023-03-07 NOTE — Clinical Note
Discharge Plan[de-identified] Home with Office Follow-up   Telemetry/Cardiac Monitoring Required?: Yes   Bed request comments: adelfo Landers

## 2023-03-07 NOTE — ANESTHESIA PRE PROCEDURE
Department of Anesthesiology  Preprocedure Note       Name:  Mini Alonso   Age:  57 y.o.  :  1960                                          MRN:  [id number]         Date:  3/7/2023      Surgeon: Sidra Ash):  Lonzell Kayser, DO    Procedure: Procedure(s):  LEFT ANKLE ORIF, SUPINE 3080 WITH RADIOLUCENT EXTENSION, C-ARM, CYSTO TUBING, ARTHREX HEADLESS COMPRESSION SCREWS, ARTHREX FIBULINK. SYNTHES.     Medications prior to admission:   Prior to Admission medications    Not on File       Current medications:    Current Facility-Administered Medications   Medication Dose Route Frequency Provider Last Rate Last Admin    sodium chloride flush 0.9 % injection 5-40 mL  5-40 mL IntraVENous 2 times per day Robet Click, DO        sodium chloride flush 0.9 % injection 5-40 mL  5-40 mL IntraVENous PRN Robet Click, DO        0.9 % sodium chloride infusion   IntraVENous PRN Robet Click, DO        ondansetron (ZOFRAN-ODT) disintegrating tablet 4 mg  4 mg Oral Q8H PRN Robet Click, DO        Or    ondansetron (ZOFRAN) injection 4 mg  4 mg IntraVENous Q6H PRN Robet Click, DO        polyethylene glycol (GLYCOLAX) packet 17 g  17 g Oral Daily Rowan Cottrell Mean, DO        senna (SENOKOT) tablet 8.6 mg  1 tablet Oral Daily PRN Robet Click, DO        lactated ringers IV soln infusion   IntraVENous Continuous Robet Click,  mL/hr at 23 1342 New Bag at 23 1342    acetaminophen (TYLENOL) tablet 1,000 mg  1,000 mg Oral 3 times per day Robet Click, DO   1,000 mg at 23 1343    oxyCODONE (ROXICODONE) immediate release tablet 2.5 mg  2.5 mg Oral Q6H PRN Robet Click, DO        gabapentin (NEURONTIN) capsule 100 mg  100 mg Oral Q8H Rowan Keane, DO   100 mg at 23 1343       Allergies:  No Known Allergies    Problem List:    Patient Active Problem List   Diagnosis Code    Open fracture of left ankle S82.892B    Bradycardia R00.1    Open ankle fracture, left, type I or II, with nonunion, subsequent encounter S82.892M       Past Medical History:  No past medical history on file. Past Surgical History:  No past surgical history on file. Social History:    Social History     Tobacco Use    Smoking status: Not on file    Smokeless tobacco: Not on file   Substance Use Topics    Alcohol use: Not on file                                Counseling given: Not Answered      Vital Signs (Current):   Vitals:    03/07/23 1233 03/07/23 1246 03/07/23 1342 03/07/23 1356   BP: (!) 159/78  (!) 160/96    Pulse: (!) 45 (!) 46 80    Resp: (!) 8  20    Temp: 98.8 °F (37.1 °C)   98.3 °F (36.8 °C)   TempSrc: Oral      SpO2:   96%    Weight: 191 lb 6.4 oz (86.8 kg)      Height: 5' 9\" (1.753 m)                                                 BP Readings from Last 3 Encounters:   03/07/23 (!) 160/96       NPO Status:                                                                                 BMI:   Wt Readings from Last 3 Encounters:   03/07/23 191 lb 6.4 oz (86.8 kg)     Body mass index is 28.26 kg/m². CBC:   Lab Results   Component Value Date/Time    WBC 5.5 03/07/2023 09:59 AM    RBC 5.40 03/07/2023 09:59 AM    HGB 14.9 03/07/2023 09:59 AM    HCT 45.4 03/07/2023 09:59 AM    MCV 84.1 03/07/2023 09:59 AM    RDW 13.0 03/07/2023 09:59 AM     03/07/2023 09:59 AM       CMP:   Lab Results   Component Value Date/Time     03/07/2023 09:59 AM    K 3.3 03/07/2023 09:59 AM     03/07/2023 09:59 AM    CO2 22 03/07/2023 09:59 AM    BUN 11 03/07/2023 09:59 AM    CREATININE 0.99 03/07/2023 09:59 AM    LABGLOM >60 03/07/2023 09:59 AM    GLUCOSE 130 03/07/2023 09:59 AM       POC Tests: No results for input(s): POCGLU, POCNA, POCK, POCCL, POCBUN, POCHEMO, POCHCT in the last 72 hours.     Coags:   Lab Results   Component Value Date/Time    PROTIME 10.9 03/07/2023 09:59 AM    INR 1.0 03/07/2023 09:59 AM    APTT 18.2 03/07/2023 09:59 AM       HCG (If Applicable): No results found for: PREGTESTUR, PREGSERUM, HCG, HCGQUANT     ABGs: No results found for: PHART, PO2ART, QXE0ZLJ, OBD0UIE, BEART, Z2CNCOHZ     Type & Screen (If Applicable):  No results found for: LABABO, LABRH    Drug/Infectious Status (If Applicable):  No results found for: HIV, HEPCAB    COVID-19 Screening (If Applicable): No results found for: COVID19        Anesthesia Evaluation  Patient summary reviewed and Nursing notes reviewed no history of anesthetic complications:   Airway: Mallampati: II  TM distance: >3 FB   Neck ROM: limited  Mouth opening: > = 3 FB   Dental:    (+) upper dentures  Comment: A  Few lower teeth remain    Pulmonary:Negative Pulmonary ROS and normal exam                               Cardiovascular:    (+) pacemaker: pacemaker,                   Neuro/Psych:   Negative Neuro/Psych ROS              GI/Hepatic/Renal: Neg GI/Hepatic/Renal ROS            Endo/Other: Negative Endo/Other ROS                    Abdominal:             Vascular: Other Findings:           Anesthesia Plan      general     ASA 3 - emergent       Induction: intravenous. MIPS: Postoperative opioids intended and Prophylactic antiemetics administered. Anesthetic plan and risks discussed with patient. Plan discussed with CRNA.                     Rodriguez Goodman MD   3/7/2023

## 2023-03-07 NOTE — H&P
TRAUMA H&P    PATIENT NAME: Avani Baron  YOB: 1960  MEDICAL RECORD NO. [id number]   DATE: 3/7/2023  PRIMARY CARE PHYSICIAN: No primary care provider on file. PATIENT EVALUATED AT THE REQUEST OF : Joseph Flowers    ACTIVATION   []Trauma Alert     [] Trauma Priority     [x]Trauma Consult. Patient Active Problem List   Diagnosis    Open fracture of left ankle    Bradycardia       IMPRESSION AND PLAN:       Diagnosis: Open left tibiotalar dislocation, fibula fx   Plan: Will require reduction and operative intervention, Ortho on board    Diagnosis: Bradycardia   Plan: EKG showing type 2 heart block, Cardiology consultation    Will plan to admit the patient, tertiary examination, operative intervention      Ernie Diaz     [] Neurosurgery     [x] Orthopedic Surgery    [] Cardiothoracic     [] Facial Trauma    [] Plastic Surgery (Burn)    [] Pediatric Surgery     [] Internal Medicine    [] Pulmonary Medicine    [] Geriatrics    [] Other: Cardiology       HISTORY:     Chief Complaint:  \"My ankle hurts\"    GENERAL DATA  Patient information was obtained from patient. History/Exam limitations: none. Injury Date: 3/7/2023   Approximate Injury Time: 0900        Transport mode:   [x]Ambulance      [] Helicopter     []Car       [] Other  Referring Hospital: -    [facility]   Location (e.g., home, farm, industry, street): Work  Specific Details of Location (e.g., bedroom, kitchen, garage, highway): [address]    [] Motor Vehicle Collision   Specific vehicle type involved (e.g., sedan, minivan, SUV, pickup truck):      Type of collision  [] Single Vehicle Collision  []Multiple Vehicle Collision  [] unknown collision type  Collision with (e.g., type of vehicle, building, barn, ditch, tree):     Mechanism considerations  [] Fatality in Same Vehicle      []Ejected       []Rollover          []Extricated    Internal Compartment   []                      []Passenger: []Front Seat        []Rear Seat     Personal Restraints  [] Unrestrained   []Lap Belt Only Restrained   [] Shoulder Belt Only Restrained  [] 3 Point Restrained  [] unknown     Air Bags  [] Front Air Bag  []Side Air Bag  []Curtain Airbag []Air Bag Not Deployed    []No Air Bag equipped in vehicle    Pediatric Consideration:      [] Booster Seat  []Infant Car Seat  [] Child Car Seat      [] Motorcycle     []Electric Bike/Moped   [] ATV   [] Bicycle/Scooter (manual)   Wearing Helmet     []Yes     []No    []Unknown         [x] Fall    []From Standing     [x]From Height _3-4_ Ft     []Down ___steps  []Other___    [] Assault with ____    [] Gunshot  Specify caliber / type of gun: ____________________________    [] Stabbing  Specify weapon type, size: _____________________________    [] Burn  []Flame   []Scald   []Electrical   []Chemical  []Inhalation   []House fire    [] Other ______________________________________________________    [] Eye protection  []Boots   []Flotation device   []Leather outerwear  []Sports gear []Other:___       HISTORY:     Edi Scott is a male that presented to the Emergency Department following a fall from work truck. Reports he fell from a roughly 3 foot truck while loading and unloading equipment. Mechanical fall. Denies dizziness, weakness, lightheadedness. No visual disturbance. He did not strike his head or neck. Denies loss of consciousness. Denies any other pain besides his left ankle. He has no known medical history. Does not take any medications. Has asymptomatic bradycardia, heart block on EKG. Traumatic loss of Consciousness [x]No   []Yes Duration(min)       [] Unknown     Total Fluids Given Prior To Arrival  mL    MEDICATIONS:   [x]  None     []  Information not available due to exam limitations documented above      ALLERGIES:   []  None    []   Information not available due to exam limitations documented above   Patient has no known allergies.     PAST MEDICAL/SURGICAL HISTORY: []  None   []   Information not available due to exam limitations documented above     Remote hx broken leg    FAMILY HISTORY   []   Information not available due to exam limitations documented above  Brother and mother with CAD, mother had AICD    SOCIAL HISTORY  []   Information not available due to exam limitations documented above  Recreational EtOH  No tobacco  No illicit drugs    Review of Systems:    Review of Systems   Constitutional:  Negative for chills and fever. HENT:  Negative for sore throat. Eyes:  Negative for visual disturbance. Respiratory:  Negative for cough and shortness of breath. Cardiovascular:  Negative for chest pain. Gastrointestinal:  Negative for abdominal distention and abdominal pain. Genitourinary:  Negative for dysuria. Musculoskeletal:  Positive for joint swelling. Skin:  Positive for wound. Neurological:  Negative for dizziness and weakness. Psychiatric/Behavioral:  Negative for behavioral problems. PHYSICAL EXAMINATION:     VITAL SIGNS:   Vitals:    03/07/23 0942   BP:    Pulse: 52   Resp: 15   Temp:    SpO2: 95%       Physical Exam  Constitutional:       Appearance: Normal appearance. HENT:      Head: Normocephalic and atraumatic. Eyes:      Extraocular Movements: Extraocular movements intact. Pupils: Pupils are equal, round, and reactive to light. Cardiovascular:      Rate and Rhythm: Bradycardia present. Pulses:           Radial pulses are 2+ on the right side and 2+ on the left side. Femoral pulses are 2+ on the right side and 2+ on the left side. Dorsalis pedis pulses are 2+ on the right side and 2+ on the left side. Posterior tibial pulses are 2+ on the right side. Pulmonary:      Effort: Pulmonary effort is normal. No respiratory distress. Abdominal:      General: There is no distension. Tenderness: There is no abdominal tenderness.    Musculoskeletal:      Comments: Open left tibiotalar dislocation   Skin:     General: Skin is warm and dry. Neurological:      General: No focal deficit present. Mental Status: He is alert and oriented to person, place, and time. Psychiatric:         Mood and Affect: Mood normal.       FOCUSED ABDOMINAL SONOGRAM FOR TRAUMA (FAST): A good  quality examination was performed by Dr. Kendra Tejeda and representative images were obtained. [x] No free fluid in the abdomen or pericardium  [] Free fluid in RUQ   [] Free fluid in LUQ  [] Free fluid in Pelvis  [] Pericardial fluid  [] Other:        RADIOLOGY  XR ANKLE LEFT (MIN 3 VIEWS)   Final Result   Severe dislocation of the ankle mortise laterally with overriding. Comminuted fracture of the distal fibular metadiaphysis with overriding and   angulation. Soft tissue swelling and apparent skin breakthrough medially.          XR ANKLE LEFT (MIN 3 VIEWS)    (Results Pending)   XR ANKLE LEFT (MIN 3 VIEWS)    (Results Pending)         LABS  Labs Reviewed   TRAUMA PANEL - Abnormal; Notable for the following components:       Result Value    Glucose 130 (*)     Potassium 3.3 (*)     Chloride 108 (*)     All other components within normal limits   BLOOD GAS, VENOUS - Abnormal; Notable for the following components:    pH, Jay[place] 7.319 (*)     HCO3, Venous 22.9 (*)     Negative Base Excess, Jayro 2.9 (*)     All other components within normal limits   SPECIMEN REJECTION   TSH WITH REFLEX   T4, FREE   VITAMIN B12 & FOLATE   HEMOGLOBIN A1C   VITAMIN D 25 HYDROXY   PREVIOUS SPECIMEN   TYPE AND SCREEN       Helena Hill MD  3/7/23, 10:38 AM

## 2023-03-07 NOTE — ED NOTES
Pt presents to the ED via ems to room with complaints of left ankle pain due to falling off a truck 3 feet while at work  Pt states he was cleaning and tripped and fell  Pt denies feeling dizzy, and chest pain or sob  Pt states pain when it happened 10/10, pt received 100mg fent PTA  Pt arrived in a collar, denies any other pain  Pt placed on cont cardiac monitor, pt presents bradycardic in high 40s low 50s  Pt denies any cardaic hx  Pt dneis taking any meds daily  Pt placed on cont cardiac monitor, ekg completed, iv established, labs drawn, labeled and will send to lab via orders   Pt alert and oriented x4, talking in complete sentences, respirations even and unlabored. Pt acting age appropriate.  White board updated, will continue to plan of care      Gay Belcher RN  03/07/23 2679

## 2023-03-07 NOTE — CONSULTS
Orthopaedic Surgery Consult  (Dr. Randall Gregg)      CC/Reason for consult:  Left open ankle fracture    HPI:      The patient is a 57 y.o. right hand-dominant male who presents to [facility] with a left open ankle fracture. 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 Patient states that he was at work trying to unload machinery from a truck when it fell onto him and his leg. He had immediate pain and was seen to have an open fracture to his ankle. He was transported to [facility] by EMS. Orthopedic surgery was consulted for further evaluation in the emergency center he was also found to have a grade 3 heart block. He denies any other pain or orthopedic complaint. At baseline he states he does not use any assistive devices for ambulation. He denies ankle pain at baseline. He has a remote history of a right ankle fracture in his 25s that did not require any fixation. He states that he really does not follow-up with a doctor and does not take any medications. He has a history of accelerated heart rate from his early 20's/30's but no longer follows with a doctor. He denies any chemical anticoagulation. Past Medical History:    No past medical history on file. Past Surgical History:    No past surgical history on file. Medications Prior to Admission:   Prior to Admission medications    Not on File     Allergies:    Patient has no known allergies. Social History:   Social History     Socioeconomic History    Marital status: Single     Family History:  No family history on file. ROS:   Constitutional: Negative for fever and chills. Respiratory: Negative for cough. Cardiovascular: Negative for chest pain. Musculoskeletal: Positive for ankle pain. Open wound to medial ankle  Skin: Negative for itching and rash. Neurological: Negative for numbness, tingling, weakness.      PE:  Blood pressure (!) 151/80, pulse 52, temperature 98.2 °F (36.8 °C), temperature source Oral, resp. rate 15, height 5' 9\" (1.753 m), weight 190 lb (86.2 kg), SpO2 95 %. Gen: Alert and oriented, NAD, cooperative. Head: Normocephalic, atraumatic. Cardiovascular: Bradycardic rate. Respiratory: Chest symmetric, no accessory muscle use. Pelvis: Stable to anterior and lateral compression without pain. RUE: Skin intact. Large skin tag to the lateral superior shoulder. No ecchymoses, abrasion, deformity, or lacerations. Non tender to palpation. No crepitus. Compartments soft and easily compressible. Able to raise his arm above his head. Full ROM at shoulder  pain. Full ROM at elbow without pain. DRUJ appears stable. Ulnar/median/AIN/PIN/radial motor intact. Axillary/MCN/median/ulnar/radial nerves SILT. Radial pulse 2+ with BCR.    LUE: Skin intact no laceration or abrasion. Healed previous laceration across the antecubital fossa. No ecchymoses, deformity. Non tender to palpation. No crepitus. Compartments soft and easily compressible. Able to raise his arm above his head. Full ROM at shoulder  pain. Full ROM at elbow without pain. DRUJ appears stable. Ulnar/median/AIN/PIN/radial motor intact. Axillary/MCN/median/ulnar/radial nerves SILT. Radial pulse 2+ with BCR. RLE: Skin intact. No ecchymoses, abrasions, deformity, or lacerations. Non tender to palpation. No crepitus. Compartments soft and easily compressible. EHL/FHL/TA/GS complex motor intact. Sural/saphenous/SPN/DPN/plantar nerve distribution SILT. Able to strait leg raise. Patient has no groin pain with log roll maneuver. Lachman 1A, knee appears stable to varus and valgus stress test at 0 and 30 degrees. DP and PT pulses 2+ with BCR. LLE: large 5 cm laceration to the medial aspect of the ankle at the level of the medial malleolus with exposed dislocated tibia. Non tender to palpation at the knee or hip. Compartments soft and easily compressible. EHL/FHL complex motor intact.  Sural/saphenous/SPN/DPN/plantar nerve distribution SILT. DP pulses 2+ with BCR. Labs:  Recent Labs     03/07/23  0959   WBC 5.5   HGB 14.9   HCT 45.4      INR PENDING      K 3.3*   BUN 11   CREATININE 0.99   GLUCOSE 130*        Imaging:   Films of the left ankle demonstrating a fracture dislocation with fracture of the distal fibula and medial malleolus    Assessment/Plan: 57 y.o. male who had a machine fall on him at work, being seen for:    -Left open ankle dislocation  -Left open craft C fibula fracture  -Left open medial malleolus fracture    -Plan for closed reduction in the ED. due to cardiac block we are unable to provide sedation per emergency department. Patient is on clear medically for the OR prior to cardiac consultation. We will plan for closed reduction with IV pain medicine due to urgency and open fracture.  -Wound was irrigated with 4 liters of saline in the ED  -We will plan to take him to the OR as soon as patient is cleared from cardiology and trauma team.  -Cardiology plans to take patient for a pacemaker prior to surgery  -Left lower extremity placed in a short leg splint  -Nonweightbearing to left lower extremity  -Ancef and gentamicin were given in the emergency department  -We will continue Ancef 2 g every 8 hours  -Recommend tetanus up-to-date  -Please keep n.p.o.  -F/u VitD level  -Patient is Taoism and is declining any blood products.  -Pain control per primary team  -Ice and elevate extremity for pain and swelling  -Please contact ortho with any questions    Procedure Note: Procedure: Risks, benefits, and alternatives have been discussed regarding closed reduction of the fracture with IV pain medicine due to high risk of sedation. Patient agreed to move forward with the proposed procedure. Before reduction the wound was irrigated with 4 L of normal saline. We proceeded to manually reduce the fracture with appreciable motion indicating improved alignment.   At this time post reduction films were obtained demonstrating improved interval alignment. Splint was applied at this point and post splint films were obtained suggesting a stable reduction. Patient tolerated the procedure well and expressed interval improvement in symptoms. All questions and concerns were addressed at this point. Carmella Perez DO  Resident Physician, PGY-2  Orthopaedic Surgery  10:33 AM 3/7/2023    This note is created with the assistance of a speech recognition program. While intending to generate a document that actually reflects the content of the visit, the document can still have some errors including those of syntax and sound a like substitutions which may escape proof reading. In such instances, actual meaning can be extrapolated by contextual diversion.

## 2023-03-07 NOTE — PROGRESS NOTES
C- Spine Evaluation for Spine Clearance:    Pt is a 57 y.o. male who was admitted on 3/7 s/p fall from 3 feet . Pt w/ complaints of let ankle pain. C-Spine precautions of C-collar with spinal neutrality maintained since arrival with current exam directed at further evaluation of spine for clearance purposes. Pt chart and current images reviewed. CT C-Spine negative for acute fracture, subluxation, or traumatic injury. Patient does not have a distracting injury, is not acutely intoxicated and is alert, oriented and fully able to participate in exam.      Pt denies c-spine pain while resting in c-collar. C-collar removed w/ c-spine neutrality maintained. Pt denies midline pain with palpation of spinous processes and axial loading. Pt demonstrated full flexion, extension, and SB ROM without complaints of pain. TLS precautions of supine position maintained since arrival.  Pt denies midline pain with palpation of spinous processes. CT dorsal lumbar negative for acute fracture, subluxation, or traumatic injury. C-spine is considered cleared w/out need for further imaging, evaluation, or continuation of c-collar. TLS considered clear w/out need for further imagine, evaluation, or continuation of supine bedrest precautions.     Electronically signed by Rakesh Suazo MD on 3/7/2023 at 6:40 PM

## 2023-03-07 NOTE — ANESTHESIA POSTPROCEDURE EVALUATION
Department of Anesthesiology  Postprocedure Note    Patient: Dary Lim  MRN: [id number]  YOB: 1960  Date of evaluation: 3/7/2023      Procedure Summary     Date: 03/07/23 Room / Location: [address]    Anesthesia Start: 1506 Anesthesia Stop: 5273    Procedure: LEFT ANKLE OPEN REDUCTION INTERNAL FIXATION, BIMALLEOLAR FRACTURE , LEFT SYNDESMOSIS, IRRIGATION AND DEBRIDEMENT OF LEFT OPEN MEDIAL MALLEOLAR FRACTURE , STRESS OF LEFT ANKLE SYNDESMOSI UNDER ANESTHESIA , WITH INDEPENDENT IMAGAING INTERPRETATION CLOSURE  OF COMPLEX WOND 6CM   C-ARM, ARTHREX (Left) Diagnosis:       Type I or II open fracture of left ankle, initial encounter      (Type I or II open fracture of left ankle, initial encounter [S82.892B])    Surgeons: Antonio Robins DO Responsible Provider: Mirian Gallagher MD    Anesthesia Type: General ASA Status: 3 - Emergent          Anesthesia Type: General    Kal Phase I:      Kal Phase II:        Anesthesia Post Evaluation    Level of consciousness: awake  Nausea & Vomiting: no vomiting and no nausea  Complications: no  Cardiovascular status: hemodynamically stable  Respiratory status: spontaneous ventilation  Hydration status: stable

## 2023-03-08 ENCOUNTER — APPOINTMENT (OUTPATIENT)
Dept: GENERAL RADIOLOGY | Age: 63
End: 2023-03-08
Payer: COMMERCIAL

## 2023-03-08 LAB
ABSOLUTE EOS #: <0.03 K/UL (ref 0–0.44)
ABSOLUTE IMMATURE GRANULOCYTE: 0.05 K/UL (ref 0–0.3)
ABSOLUTE LYMPH #: 0.7 K/UL (ref 1.1–3.7)
ABSOLUTE MONO #: 0.92 K/UL (ref 0.1–1.2)
AMPHETAMINE SCREEN URINE: NEGATIVE
ANION GAP SERPL CALCULATED.3IONS-SCNC: 14 MMOL/L (ref 9–17)
BARBITURATE SCREEN URINE: NEGATIVE
BASOPHILS # BLD: 0 % (ref 0–2)
BASOPHILS ABSOLUTE: <0.03 K/UL (ref 0–0.2)
BENZODIAZEPINE SCREEN, URINE: NEGATIVE
BILIRUBIN URINE: NEGATIVE
BUN SERPL-MCNC: 13 MG/DL (ref 8–23)
CALCIUM SERPL-MCNC: 8.9 MG/DL (ref 8.6–10.4)
CANNABINOID SCREEN URINE: NEGATIVE
CHLORIDE SERPL-SCNC: 103 MMOL/L (ref 98–107)
CO2 SERPL-SCNC: 20 MMOL/L (ref 20–31)
COCAINE METABOLITE, URINE: NEGATIVE
COLOR: YELLOW
COMMENT UA: NORMAL
CREAT SERPL-MCNC: 1.33 MG/DL (ref 0.7–1.2)
EKG ATRIAL RATE: 83 BPM
EKG P AXIS: 61 DEGREES
EKG Q-T INTERVAL: 432 MS
EKG QRS DURATION: 98 MS
EKG QTC CALCULATION (BAZETT): 369 MS
EKG R AXIS: 52 DEGREES
EKG T AXIS: 34 DEGREES
EKG VENTRICULAR RATE: 44 BPM
EOSINOPHILS RELATIVE PERCENT: 0 % (ref 1–4)
FENTANYL URINE: POSITIVE
FREE KAPPA/LAMBDA RATIO: 1.01 (ref 0.26–1.65)
GFR SERPL CREATININE-BSD FRML MDRD: 60 ML/MIN/1.73M2
GLUCOSE SERPL-MCNC: 153 MG/DL (ref 70–99)
GLUCOSE UR STRIP.AUTO-MCNC: NEGATIVE MG/DL
HCT VFR BLD AUTO: 46.2 % (ref 40.7–50.3)
HGB BLD-MCNC: 14.8 G/DL (ref 13–17)
IMMATURE GRANULOCYTES: 1 %
INR PPP: 1
KAPPA LC FREE SER-MCNC: 1.54 MG/DL (ref 0.37–1.94)
KETONES UR STRIP.AUTO-MCNC: NEGATIVE MG/DL
LAMBDA LC FREE SERPL-MCNC: 1.53 MG/DL (ref 0.57–2.63)
LEUKOCYTE ESTERASE UR QL STRIP.AUTO: NEGATIVE
LV EF: 53 %
LVEF MODALITY: NORMAL
LYMPHOCYTES # BLD: 8 % (ref 24–43)
MAGNESIUM SERPL-MCNC: 1.9 MG/DL (ref 1.6–2.6)
MCH RBC QN AUTO: 27.3 PG (ref 25.2–33.5)
MCHC RBC AUTO-ENTMCNC: 32 G/DL (ref 28.4–34.8)
MCV RBC AUTO: 85.2 FL (ref 82.6–102.9)
METHADONE SCREEN, URINE: NEGATIVE
MONOCYTES # BLD: 10 % (ref 3–12)
NITRITE UR QL STRIP.AUTO: NEGATIVE
NRBC AUTOMATED: 0 PER 100 WBC
OPIATES, URINE: NEGATIVE
OXYCODONE SCREEN URINE: POSITIVE
PARTIAL THROMBOPLASTIN TIME: 24.3 SEC (ref 20.5–30.5)
PDW BLD-RTO: 13.2 % (ref 11.8–14.4)
PHENCYCLIDINE, URINE: NEGATIVE
PHOSPHATE SERPL-MCNC: 3.7 MG/DL (ref 2.5–4.5)
PLATELET # BLD AUTO: 279 K/UL (ref 138–453)
PMV BLD AUTO: 10.5 FL (ref 8.1–13.5)
POTASSIUM SERPL-SCNC: 4.4 MMOL/L (ref 3.7–5.3)
PROT UR STRIP.AUTO-MCNC: 6.5 MG/DL (ref 5–8)
PROT UR STRIP.AUTO-MCNC: NEGATIVE MG/DL
PROTHROMBIN TIME: 11.1 SEC (ref 9.1–12.3)
RBC # BLD: 5.42 M/UL (ref 4.21–5.77)
SEG NEUTROPHILS: 82 % (ref 36–65)
SEGMENTED NEUTROPHILS ABSOLUTE COUNT: 7.55 K/UL (ref 1.5–8.1)
SODIUM SERPL-SCNC: 137 MMOL/L (ref 135–144)
SPECIFIC GRAVITY UA: 1.01 (ref 1–1.03)
TEST INFORMATION: ABNORMAL
TURBIDITY: CLEAR
URINE HGB: NEGATIVE
UROBILINOGEN, URINE: NORMAL
WBC # BLD AUTO: 9.2 K/UL (ref 3.5–11.3)

## 2023-03-08 PROCEDURE — 80048 BASIC METABOLIC PNL TOTAL CA: CPT

## 2023-03-08 PROCEDURE — 99223 1ST HOSP IP/OBS HIGH 75: CPT | Performed by: INTERNAL MEDICINE

## 2023-03-08 PROCEDURE — 6370000000 HC RX 637 (ALT 250 FOR IP): Performed by: STUDENT IN AN ORGANIZED HEALTH CARE EDUCATION/TRAINING PROGRAM

## 2023-03-08 PROCEDURE — 6370000000 HC RX 637 (ALT 250 FOR IP): Performed by: NURSE PRACTITIONER

## 2023-03-08 PROCEDURE — 2000000000 HC ICU R&B

## 2023-03-08 PROCEDURE — 71045 X-RAY EXAM CHEST 1 VIEW: CPT

## 2023-03-08 PROCEDURE — 93306 TTE W/DOPPLER COMPLETE: CPT

## 2023-03-08 PROCEDURE — 83521 IG LIGHT CHAINS FREE EACH: CPT

## 2023-03-08 PROCEDURE — 86334 IMMUNOFIX E-PHORESIS SERUM: CPT

## 2023-03-08 PROCEDURE — 36415 COLL VENOUS BLD VENIPUNCTURE: CPT

## 2023-03-08 PROCEDURE — 84100 ASSAY OF PHOSPHORUS: CPT

## 2023-03-08 PROCEDURE — 2580000003 HC RX 258

## 2023-03-08 PROCEDURE — 83735 ASSAY OF MAGNESIUM: CPT

## 2023-03-08 PROCEDURE — 84155 ASSAY OF PROTEIN SERUM: CPT

## 2023-03-08 PROCEDURE — 93010 ELECTROCARDIOGRAM REPORT: CPT | Performed by: INTERNAL MEDICINE

## 2023-03-08 PROCEDURE — 80307 DRUG TEST PRSMV CHEM ANLYZR: CPT

## 2023-03-08 PROCEDURE — 84165 PROTEIN E-PHORESIS SERUM: CPT

## 2023-03-08 PROCEDURE — 6360000002 HC RX W HCPCS: Performed by: STUDENT IN AN ORGANIZED HEALTH CARE EDUCATION/TRAINING PROGRAM

## 2023-03-08 PROCEDURE — 85610 PROTHROMBIN TIME: CPT

## 2023-03-08 PROCEDURE — 81003 URINALYSIS AUTO W/O SCOPE: CPT

## 2023-03-08 PROCEDURE — 2580000003 HC RX 258: Performed by: STUDENT IN AN ORGANIZED HEALTH CARE EDUCATION/TRAINING PROGRAM

## 2023-03-08 PROCEDURE — 85730 THROMBOPLASTIN TIME PARTIAL: CPT

## 2023-03-08 PROCEDURE — 85025 COMPLETE CBC W/AUTO DIFF WBC: CPT

## 2023-03-08 PROCEDURE — 2580000003 HC RX 258: Performed by: NURSE PRACTITIONER

## 2023-03-08 RX ORDER — SODIUM CHLORIDE, SODIUM LACTATE, POTASSIUM CHLORIDE, CALCIUM CHLORIDE 600; 310; 30; 20 MG/100ML; MG/100ML; MG/100ML; MG/100ML
INJECTION, SOLUTION INTRAVENOUS CONTINUOUS
Status: DISCONTINUED | OUTPATIENT
Start: 2023-03-08 | End: 2023-03-08

## 2023-03-08 RX ORDER — SENNA PLUS 8.6 MG/1
1 TABLET ORAL DAILY
Status: DISCONTINUED | OUTPATIENT
Start: 2023-03-09 | End: 2023-03-10 | Stop reason: HOSPADM

## 2023-03-08 RX ORDER — SODIUM CHLORIDE, SODIUM LACTATE, POTASSIUM CHLORIDE, CALCIUM CHLORIDE 600; 310; 30; 20 MG/100ML; MG/100ML; MG/100ML; MG/100ML
INJECTION, SOLUTION INTRAVENOUS CONTINUOUS
Status: DISCONTINUED | OUTPATIENT
Start: 2023-03-08 | End: 2023-03-10 | Stop reason: HOSPADM

## 2023-03-08 RX ORDER — ACETAMINOPHEN 500 MG
1000 TABLET ORAL EVERY 8 HOURS SCHEDULED
Status: DISCONTINUED | OUTPATIENT
Start: 2023-03-08 | End: 2023-03-10 | Stop reason: HOSPADM

## 2023-03-08 RX ORDER — POTASSIUM CHLORIDE 7.45 MG/ML
10 INJECTION INTRAVENOUS
Status: DISCONTINUED | OUTPATIENT
Start: 2023-03-08 | End: 2023-03-08

## 2023-03-08 RX ADMIN — ACETAMINOPHEN 1000 MG: 500 TABLET ORAL at 21:09

## 2023-03-08 RX ADMIN — Medication 2000 MG: at 13:29

## 2023-03-08 RX ADMIN — SODIUM CHLORIDE, PRESERVATIVE FREE 10 ML: 5 INJECTION INTRAVENOUS at 21:09

## 2023-03-08 RX ADMIN — EPOETIN ALFA-EPBX 20000 UNITS: 20000 INJECTION, SOLUTION INTRAVENOUS; SUBCUTANEOUS at 16:37

## 2023-03-08 RX ADMIN — OXYCODONE HYDROCHLORIDE 5 MG: 5 TABLET ORAL at 09:27

## 2023-03-08 RX ADMIN — SODIUM CHLORIDE, POTASSIUM CHLORIDE, SODIUM LACTATE AND CALCIUM CHLORIDE: 600; 310; 30; 20 INJECTION, SOLUTION INTRAVENOUS at 14:39

## 2023-03-08 RX ADMIN — ACETAMINOPHEN 1000 MG: 500 TABLET ORAL at 13:29

## 2023-03-08 RX ADMIN — SODIUM CHLORIDE, POTASSIUM CHLORIDE, SODIUM LACTATE AND CALCIUM CHLORIDE: 600; 310; 30; 20 INJECTION, SOLUTION INTRAVENOUS at 06:15

## 2023-03-08 RX ADMIN — POLYETHYLENE GLYCOL 3350 17 G: 17 POWDER, FOR SOLUTION ORAL at 07:46

## 2023-03-08 RX ADMIN — OXYCODONE HYDROCHLORIDE 5 MG: 5 TABLET ORAL at 01:54

## 2023-03-08 RX ADMIN — GABAPENTIN 100 MG: 100 CAPSULE ORAL at 12:03

## 2023-03-08 RX ADMIN — IRON SUCROSE 100 MG: 20 INJECTION, SOLUTION INTRAVENOUS at 16:45

## 2023-03-08 RX ADMIN — GABAPENTIN 100 MG: 100 CAPSULE ORAL at 20:29

## 2023-03-08 RX ADMIN — ONDANSETRON 4 MG: 2 INJECTION INTRAMUSCULAR; INTRAVENOUS at 00:15

## 2023-03-08 RX ADMIN — SODIUM CHLORIDE, POTASSIUM CHLORIDE, SODIUM LACTATE AND CALCIUM CHLORIDE: 600; 310; 30; 20 INJECTION, SOLUTION INTRAVENOUS at 00:02

## 2023-03-08 RX ADMIN — GABAPENTIN 100 MG: 100 CAPSULE ORAL at 03:52

## 2023-03-08 RX ADMIN — Medication 2000 MG: at 05:25

## 2023-03-08 ASSESSMENT — PAIN SCALES - GENERAL
PAINLEVEL_OUTOF10: 0
PAINLEVEL_OUTOF10: 2
PAINLEVEL_OUTOF10: 0
PAINLEVEL_OUTOF10: 8
PAINLEVEL_OUTOF10: 0
PAINLEVEL_OUTOF10: 8
PAINLEVEL_OUTOF10: 0
PAINLEVEL_OUTOF10: 5
PAINLEVEL_OUTOF10: 0
PAINLEVEL_OUTOF10: 2
PAINLEVEL_OUTOF10: 3
PAINLEVEL_OUTOF10: 0
PAINLEVEL_OUTOF10: 0

## 2023-03-08 ASSESSMENT — PAIN SCALES - WONG BAKER
WONGBAKER_NUMERICALRESPONSE: 0
WONGBAKER_NUMERICALRESPONSE: 0

## 2023-03-08 ASSESSMENT — PAIN DESCRIPTION - LOCATION
LOCATION: ANKLE
LOCATION: ANKLE

## 2023-03-08 ASSESSMENT — LIFESTYLE VARIABLES
HOW MANY STANDARD DRINKS CONTAINING ALCOHOL DO YOU HAVE ON A TYPICAL DAY: PATIENT DOES NOT DRINK
HOW OFTEN DO YOU HAVE A DRINK CONTAINING ALCOHOL: NEVER

## 2023-03-08 ASSESSMENT — PAIN - FUNCTIONAL ASSESSMENT: PAIN_FUNCTIONAL_ASSESSMENT: ACTIVITIES ARE NOT PREVENTED

## 2023-03-08 ASSESSMENT — PAIN DESCRIPTION - ORIENTATION: ORIENTATION: LEFT

## 2023-03-08 ASSESSMENT — PAIN DESCRIPTION - DESCRIPTORS: DESCRIPTORS: ACHING

## 2023-03-08 NOTE — CONSULTS
Today's Date: 3/8/2023  Patient Name: Chavo Mariscal  Date of admission: 3/7/2023  9:36 AM  Patient's age: 57 y.o., 1960  Admission Dx: Open ankle fracture, left, type I or II, with nonunion, subsequent encounter [S82.892M]  Type I or II open fracture of left ankle, initial encounter [S82.892B]    Reason for Consult: management recommendations  Requesting Physician: Orlando Fontenot MD    Chief Complaint: Traumatic injury to left foot with fall    History Obtained From:  patient    History of Present Illness: The patient is a 57 y.o. male who is admitted to the hospital for traumatic injury to fall leading to open fracture of left ankle requiring ORIF. 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 During trauma work-up, CT lumbar spine showed 1 cm intraosseous lucency in the posterior aspect of the right ilium and smaller similar lucency seen in the lower posterior aspect of the left ilium. Heme-onc has been consulted to evaluate for these lucencies. On my evaluation, patient is alert oriented x3. He is resting comfortably in the bed. Not in acute distress. Requiring intermittent pacing with the pacemaker. Has intact dressing on the left lower extremity after surgery for ankle fracture. He does have some PATRICIA with creatinine 1.33. CBC reviewed and cell counts are appropriate. Past Medical History:   has no past medical history on file. Past Surgical History:   has a past surgical history that includes Ankle fracture surgery (Left, 3/7/2023). Medications:    Prior to Admission medications    Medication Sig Start Date End Date Taking?  Authorizing Provider   vitamin D (ERGOCALCIFEROL) 1.25 MG (19604 UT) CAPS capsule Take 1 capsule by mouth once a week for 8 doses 3/7/23 4/26/23 Yes Norma Tolbert, DO     Current Facility-Administered Medications   Medication Dose Route Frequency Provider Last Rate Last Admin    acetaminophen (TYLENOL) tablet 1,000 mg  1,000 mg Oral 3 times per day Rhoda Lose, APRN - CNP   1,000 mg at 03/08/23 1329    [START ON 3/9/2023] senna (SENOKOT) tablet 8.6 mg  1 tablet Oral Daily Rhoda Lose, APRN - CNP        lactated ringers IV soln infusion   IntraVENous Continuous Rhoda Lose, APRN - CNP 60 mL/hr at 03/08/23 1439 New Bag at 03/08/23 1439    sodium chloride flush 0.9 % injection 5-40 mL  5-40 mL IntraVENous 2 times per day Cecilia Reynoso MD        sodium chloride flush 0.9 % injection 5-40 mL  5-40 mL IntraVENous PRN Cecilia Reynoso MD        0.9 % sodium chloride infusion   IntraVENous PRN Cecilia Reynoso MD        ondansetron (ZOFRAN-ODT) disintegrating tablet 4 mg  4 mg Oral Q8H PRN Cecilia Reynoso MD        Or    ondansetron [facility]) injection 4 mg  4 mg IntraVENous Q6H PRN Cecilia Reynoso MD   4 mg at 03/08/23 0015    polyethylene glycol (GLYCOLAX) packet 17 g  17 g Oral Daily Cecilia Reynoso MD   17 g at 03/08/23 0746    gabapentin (NEURONTIN) capsule 100 mg  100 mg Oral Q8H Cecilia Reynoso MD   100 mg at 03/08/23 1203    Epoetin [place]-epbx (RETACRIT) injection 20,000 Units  20,000 Units SubCUTAneous Daily Cecilia Reynoso MD   20,000 Units at 03/07/23 1839    iron sucrose (VENOFER) 100 mg in sodium chloride 0.9 % 100 mL IVPB  100 mg IntraVENous Q24H Cecilia Reynoso MD   Stopped at 03/07/23 1900    oxyCODONE (ROXICODONE) immediate release tablet 5 mg  5 mg Oral Q6H PRN Roegr Gaytan MD   5 mg at 03/08/23 4373       Allergies:  Patient has no known allergies. Social History:        Family History: Family history is unknown by patient. Review of Symptoms:    Constitutional: No fever or chills.  No night sweats, no weight loss   Eyes: No eye discharge, double vision, or eye pain   HEENT: negative for sore mouth, sore throat, hoarseness and voice change   Respiratory: negative for cough , sputum, dyspnea, wheezing, hemoptysis, chest pain Cardiovascular: negative for chest pain, dyspnea, palpitations, orthopnea, PND   Gastrointestinal: negative for nausea, vomiting, diarrhea, constipation, abdominal pain, Dysphagia, hematemesis and hematochezia   Genitourinary: negative for frequency, dysuria, nocturia, urinary incontinence, and hematuria   Integument: negative for rash, skin lesions, bruises.    Hematologic/Lymphatic: negative for easy bruising, bleeding, lymphadenopathy, or petechiae   Endocrine: negative for heat or cold intolerance,weight changes, change in bowel habits and hair loss   Musculoskeletal: Left lower extremity pain after ankle fracture surgery  Neurological: negative for headaches, dizziness, seizures, weakness, numbness    PHYSICAL EXAM:      /62   Pulse 65   Temp 99.1 °F (37.3 °C) (Oral)   Resp 17   Ht 5' 9\" (1.753 m)   Wt 191 lb 6.4 oz (86.8 kg)   SpO2 96%   BMI 28.26 kg/m²    Temp (24hrs), Av.9 °F (37.2 °C), Min:98.2 °F (36.8 °C), Max:99.8 °F (37.7 °C)      General appearance - well appearing, no in pain or distress   Mental status - alert and cooperative   Eyes - pupils equal and reactive, extraocular eye movements intact   Ears - bilateral TM's and external ear canals normal   Mouth - mucous membranes moist, pharynx normal without lesions   Neck - supple, no significant adenopathy   Lymphatics - no palpable lymphadenopathy, no hepatosplenomegaly   Chest - clear to auscultation, no wheezes, rales or rhonchi, symmetric air entry   Heart - normal rate, regular rhythm, normal S1, S2, no murmurs  Abdomen - soft, nontender, nondistended, no masses or organomegaly   Neurological - alert, oriented, normal speech, no focal findings or movement disorder noted   Musculoskeletal -intact dressing of the left lower extremity after ankle fracture surgery  Extremities - peripheral pulses normal, no pedal edema, no clubbing or cyanosis   Skin - normal coloration and turgor, no rashes, no suspicious skin lesions noted ,    Labs:   Complete Blood Count:   Recent Labs     03/07/23  0959 03/08/23  0511   WBC 5.5 9.2   HGB 14.9 14.8   MCV 84.1 85.2    279   RBC 5.40 5.42   HCT 45.4 46.2   MCH 27.6 27.3   MCHC 32.8 32.0   RDW 13.0 13.2   MPV 11.1 10.5      PT/INR:    Lab Results   Component Value Date/Time    PROTIME 11.1 03/08/2023 05:11 AM    INR 1.0 03/08/2023 05:11 AM     PTT:    Lab Results   Component Value Date/Time    APTT 24.3 03/08/2023 05:11 AM       Basal Metabolic Profile:   Recent Labs     03/07/23  0959 03/08/23  0511    137   K 3.3* 4.4   BUN 11 13   CREATININE 0.99 1.33*   * 103   CO2 22 20      LFTs  No results for input(s): ALKPHOS, ALT, AST, BILITOT, BILIDIR, LABALBU in the last 72 hours. Imaging:  XR TIBIA FIBULA LEFT (2 VIEWS)    Result Date: 3/7/2023  1. Overall near anatomic alignment of the left ankle following ORIF. 2. 6 mm metallic foreign body in the soft tissues medial to the left tibial plateau. XR ANKLE LEFT (2 VIEWS)    Result Date: 3/7/2023  Minimally displaced medial malleolar fracture. Minimally displaced comminuted fracture of the distal fibular metadiaphysis. Minimal lateral displacement of the ankle mortise. Soft tissue swelling. XR ANKLE LEFT (2 VIEWS)    Result Date: 3/7/2023  Nondisplaced medial malleolar fracture. Minimally displaced comminuted distal fibular metadiaphysis fracture. Soft tissue swelling. XR ANKLE LEFT (2 VIEWS)    Result Date: 3/7/2023  Nondisplaced medial malleolar fracture. Minimally displaced comminuted fracture of the distal fibular metadiaphysis. Soft tissue swelling. XR ANKLE LEFT (MIN 3 VIEWS)    Result Date: 3/7/2023  1. Overall near anatomic alignment of the left ankle following ORIF. 2. 6 mm metallic foreign body in the soft tissues medial to the left tibial plateau. XR ANKLE LEFT (MIN 3 VIEWS)    Result Date: 3/7/2023  Severe dislocation of the ankle mortise laterally with overriding.  Comminuted fracture of the distal fibular metadiaphysis with overriding and angulation. Soft tissue swelling and apparent skin breakthrough medially. CT HEAD WO CONTRAST    Result Date: 3/7/2023  No acute intracranial abnormality. CT CERVICAL SPINE WO CONTRAST    Result Date: 3/7/2023  Multilevel cervical spondylosis and degenerative disc disease. Mild spinal stenosis at C5-6 and C6-7 Evidence of paracervical spasm No acute bony abnormalities are noted in the cervical and thoracic spine 1 cm intraosseous lucency in the posterior aspect of the right ilium. Smaller similar lucency seen in the lower posterior aspect of the left ilium. Small lytic lesions cannot be excluded. L4-5 and L5-S1 degenerative disc disease. There are erosive changes of the vertebral endplates and an underlying infectious/inflammatory process cannot be entirely excluded. RECOMMENDATIONS: Further evaluation of the lumbar spine should be obtained with MR imaging if clinically indicated. CT CHEST ABDOMEN PELVIS WO CONTRAST Additional Contrast? None    Result Date: 3/7/2023  1. No acute intrathoracic abnormality. 2. No acute intra-abdominal abnormality within the limitations of a noncontrast study. 3. Prostatomegaly. 4. Urinary bladder wall thickening, which may be due to chronic outlet obstruction. 5. Bilateral nonobstructing nephrolithiasis. CT LUMBAR SPINE TRAUMA RECONSTRUCTION    Result Date: 3/7/2023  Multilevel cervical spondylosis and degenerative disc disease. Mild spinal stenosis at C5-6 and C6-7 Evidence of paracervical spasm No acute bony abnormalities are noted in the cervical and thoracic spine 1 cm intraosseous lucency in the posterior aspect of the right ilium. Smaller similar lucency seen in the lower posterior aspect of the left ilium. Small lytic lesions cannot be excluded. L4-5 and L5-S1 degenerative disc disease. There are erosive changes of the vertebral endplates and an underlying infectious/inflammatory process cannot be entirely excluded. RECOMMENDATIONS: Further evaluation of the lumbar spine should be obtained with MR imaging if clinically indicated. CT THORACIC SPINE TRAUMA RECONSTRUCTION    Result Date: 3/7/2023  Multilevel cervical spondylosis and degenerative disc disease. Mild spinal stenosis at C5-6 and C6-7 Evidence of paracervical spasm No acute bony abnormalities are noted in the cervical and thoracic spine 1 cm intraosseous lucency in the posterior aspect of the right ilium. Smaller similar lucency seen in the lower posterior aspect of the left ilium. Small lytic lesions cannot be excluded. L4-5 and L5-S1 degenerative disc disease. There are erosive changes of the vertebral endplates and an underlying infectious/inflammatory process cannot be entirely excluded. RECOMMENDATIONS: Further evaluation of the lumbar spine should be obtained with MR imaging if clinically indicated. Impression:   Primary Problem  Open ankle fracture, left, type I or II, with nonunion, subsequent encounter  Active Hospital Problems    Diagnosis Date Noted    Open fracture of left ankle [S82.892B] 03/07/2023     Priority: Medium    Bradycardia [R00.1] 03/07/2023     Priority: Medium    Open ankle fracture, left, type I or II, with nonunion, subsequent encounter [S82.892M] 03/07/2023     Priority: Medium     Incidental finding of lucent lesions in the right and left ilium  Traumatic injury with fall leading to open fracture of left ankle status post reduction surgery  Due to 1 AV block status post placement of temporary pacemaker    Assessment and Plan:    Obtain MRI for better evaluation of interosseous lucent lesions of ileum seen on CT scan  Obtain myeloma work-up  We will follow along    Discussed with patient and Nurse. Thank you for asking us to see this patient.     Katelyn Corral MD  Internal Medicine Resident, PGY-3  [address]         3/8/2023, 4:12 PM    I have seen and examined the patient independently. I reviewed all laboratory and imaging studies that are relevant. I agree with the resident note with the addendum. Incidental finding on the right and left ilium bone concern about lytic lesion  We will obtain myeloma work-up and MRI of the hip if MRI cannot be done due to pacemaker will obtain outpatient PET/CT    Electronically signed by Kristie Quezada MD on 3/8/2023 at 11:11 PM           This note is created with the assistance of a speech recognition program.  While intending to generate a document that actually reflects the content of the visit, the document can still have some errors including those of syntax and sound a like substitutions which may escape proof reading. It such instances, actual meaning can be extrapolated by contextual diversion.

## 2023-03-08 NOTE — PLAN OF CARE
Problem: Discharge Planning  Goal: Discharge to home or other facility with appropriate resources  3/8/2023 0301 by Rogerio Pineda RN  Outcome: Progressing  Flowsheets  Taken 3/7/2023 2000 by Rogerio Pineda RN  Discharge to home or other facility with appropriate resources:   Identify barriers to discharge with patient and caregiver   Arrange for needed discharge resources and transportation as appropriate   Identify discharge learning needs (meds, wound care, etc)   Arrange for interpreters to assist at discharge as needed   Refer to discharge planning if patient needs post-hospital services based on physician order or complex needs related to functional status, cognitive ability or social support system  Taken 3/7/2023 1840 by Sheridan To  Discharge to home or other facility with appropriate resources:   Identify barriers to discharge with patient and caregiver   Arrange for needed discharge resources and transportation as appropriate  Taken 3/7/2023 1825 by Sheridan To  Discharge to home or other facility with appropriate resources:   Identify barriers to discharge with patient and caregiver   Arrange for needed discharge resources and transportation as appropriate  Taken 3/7/2023 1810 by Sheridan To  Discharge to home or other facility with appropriate resources:   Identify barriers to discharge with patient and caregiver   Arrange for needed discharge resources and transportation as appropriate  3/7/2023 1525 by Cory Contreras RN  Outcome: Progressing  Flowsheets (Taken 3/7/2023 1246 by Sheridan To)  Discharge to home or other facility with appropriate resources:   Identify barriers to discharge with patient and caregiver   Arrange for needed discharge resources and transportation as appropriate     Problem: Pain  Goal: Verbalizes/displays adequate comfort level or baseline comfort level  3/8/2023 0301 by Rogerio Pineda RN  Outcome: Progressing  3/7/2023 1525 by Donnie Dakins Charma Presume, RN  Outcome: Progressing  Flowsheets (Taken 3/7/2023 1233)  Verbalizes/displays adequate comfort level or baseline comfort level: Encourage patient to monitor pain and request assistance     Problem: ABCDS Injury Assessment  Goal: Absence of physical injury  3/8/2023 0301 by Greg Lai RN  Outcome: Progressing  3/7/2023 1525 by Porfirio Barajas RN  Outcome: Progressing     Problem: Safety - Adult  Goal: Free from fall injury  3/8/2023 0301 by Greg Lai RN  Outcome: Progressing  Flowsheets (Taken 3/7/2023 1526 by Porfirio Barajas RN)  Free From Fall Injury: Based on caregiver fall risk screen, instruct family/caregiver to ask for assistance with transferring infant if caregiver noted to have fall risk factors  3/7/2023 1525 by Porfirio Barajas RN  Outcome: Progressing

## 2023-03-08 NOTE — OP NOTE
[address]                  [facility]                                OPERATIVE REPORT    PATIENT NAME: PEDRO Durant                   :        1960  MED REC NO:   [id number]                             ROOM:       [id number]  ACCOUNT NO:   [de-identified]                           ADMIT DATE: 2023  PROVIDER:     Chauncey Verde    DATE OF PROCEDURE:  2023    PREOPERATIVE DIAGNOSIS:  Type 3A open bimalleolar fracture of left  ankle. POSTOPERATIVE DIAGNOSES:  1. Type 3A open bimalleolar fracture of left ankle. 2.  Left ankle syndesmotic injury. PROCEDURES:  1. Open reduction and internal fixation, left bimalleolar ankle  fracture. 2.  Open reduction and internal fixation of left syndesmosis. 3.  Irrigation, excisional debridement of skin down to and including the  bone of open left ankle fracture. 4.  Stress examination of left ankle under anesthesia with independent  interpretation of imaging. 5.  Complex wound closure, medial left ankle, measuring 6 cm. SURGEON:  Chauncey Verde DO    ASSISTANTS:  Jory Lomax MD, PGY-3, and Leslie Cantu DO, PGY-2. ANESTHESIA:  General.    ESTIMATED BLOOD LOSS:  25 mL. COMPLICATIONS:  None. SPECIMENS:  None. IMPLANTS:  Arthrex 3.5 mm headless compression screws x2 and Arthrex  FibuLock 3.8 x 130 mm nail and one TightRope. FINDINGS:  Type 3A open left ankle fracture with periosteal stripping of  medial malleolus, Danielle C fibula and 6 cm wound on medial aspect of left  ankle. INDICATIONS:  This is a 58-year-old male who presented to [place] [address] and was found to have an open left bimalleolar  ankle fracture dislocation after he misstepped while getting his truck  at work. Imaging performed demonstrated displaced fracture dislocation  of his left ankle.   However, the patient was significant bradycardic and  was found to have type 3 heart block for which sedation in the ED could  not be performed. The patient's ankle was still closed and reduced by  orthopedic surgery resident of the ankle and placed in a splint after  ankle was irrigated. The patient was also started on antibiotics and  tetanus was updated per the ED staff. The patient urgently went with  Cardiology for placement of temporary pacemaker. The patient was then  cleared for operative intervention. Discussed with the patient need for  operative intervention as this was an open fracture and decreased  infection as well as it stabilizes his unstable left ankle fracture. The patient was amenable to this. Consent was obtained and placed in  chart. All questions were answered appropriately. Surgical risks  including but not limited to bleeding, blood clots, infection, damage to  nearby tissues, vessels and nerves, wound healing complications, failed  procedure, stiffness, loss of motion, hardware failure, hardware  irritation, patient dissatisfaction, anesthesia risk, malunion,  nonunion, loss of limb, and loss of life were all discussed with the  patient. Knowing these risks, the patient wished to proceed with  surgery as indicated. OPERATIVE PROCEDURE:  The patient was taken to the operating suite,  placed under general anesthesia without any complications. A 2 gm of  Ancef was given prior to the procedure. At this time, all team members  paused to identify proper patient name, indication, site and allergies. All team members were in agreeance. Left lower extremity was then  prepped and draped in normal sterile fashion. We began with the medial  wound over his ankle. It was measured 6 cm in length. There was no  gross contamination. The posterior tibialis tendon as well as the  posterior structures of the ankle were visualized, and they did appear  to be intact in their entirety.   We did debride with a curette some mild  dirt that was present at the fracture site of the medial malleolus as  well as the skin edges and subcutaneous tissue that was nonviable  including the periosteum. We then thoroughly irrigated the wound with  normal saline. Gloves were then exchanged. We then placed a small  drill in the proximal extent of the intact tibia and then used a  point-of-reduction clamp through the hole to anatomically reduce our  medial malleolus fracture using AP and lateral radiographs to confirm  our reduction. Being satisfied, we placed our guidewires for our  Arthrex headless compression screws. After placing them in appropriate  position, confirmed with both AP and lateral radiographs, we measured,  drilled and placed two 3.5-mm headless compression screws, both of which  had excellent fixation. Being satisfied, guidewire was removed as well  as clamp. We then turned our attention laterally and a small incision  was made at the distal tip of the left fibula. Guidewire for the  Arthrex FibuLock nail was placed in appropriate starting point,  confirmed with both AP and lateral radiographs. Guidewire was then  advanced, followed by opening a reamer. Guidewire was then passed up  followed by a drill. We preoperatively measured the appropriate length  of our nails and determined that a 130-mm nail would be adequate for  fixation. It was noted that a nail was chosen as this was more of a  comminuted fracture of the fibula and to prevent any periosteal  stripping and more soft tissue friendly intramedullary device was  chosen. Nail was passed; however, there was a slight shortening of the  fracture. So, a small incision was made at the fracture site. Point-of-reduction clamp was introduced to maintain our reduction and  then nail was repassed. Being satisfied with the placement of the nail  tightly, proximal talons were engaged.   We then used the distal  targeting guide, incised the skin, drilled, measured and placed three  interlocking screws distally within the nail. Being satisfied, we did  perform an external rotation and stress examination and there was medial  clear space widening as well as widening of the syndesmosis indicating  syndesmotic injury. So, a small incision was made on the anterolateral  aspect right over the syndesmosis of the ankle. Dissection was carried  down through the skin and subcutaneous tissues. The syndesmosis was  visualized. It was noted that anterior-inferior tibiofibular ligament  was avulsed off the tibia and there was significant gapping within the  syndesmosis. So, we placed our guidewire for passage of our Arthrex  TightRope. A small incision was made. We then drilled  quadricortically, passed our Arthrex suture button, appropriately  visualized our reduction and held our reduction in place manually,  visualized the reduction the whole time and then tightened down our  TightRope. Being satisfied with the tensioning, we tied, cut a  TightRope and then reexamined the syndesmosis. It was deemed stable. Being satisfied, final fluoroscopic images were taken. We thoroughly  irrigated all wounds with normal saline. We did a layered closure  medially using 2-0 Monocryl and 2-0 nylon in horizontal mattress  fashion. The remainder of the wounds were also closed using 2-0  Monocryl and 2-0 nylon in horizontal mattress fashion after antibiotic  powder in the form of vancomycin and tobramycin were applied over the  medial wound. We then dressed all the wounds using Xeroform, fluffs,  ABD, and a well-padded posterior short-leg splint was then applied. The  patient was then awoken from general anesthesia, transferred to PACU in  stable condition. I was present for all aspects of procedure. Meticulous dissection was used and thorough hemostasis was achieved. The patient will be nonweightbearing in left lower extremity and follow  up in my clinic in 2 weeks.         Milind Song    D: 03/07/2023 18:19:07       T: 03/07/2023 18:23:55     [id number]  Job#: [id number]     Doc#: [id number]    CC:

## 2023-03-08 NOTE — PLAN OF CARE
Problem: Discharge Planning  Goal: Discharge to home or other facility with appropriate resources  3/8/2023 1653 by Filiberto Bhatti RN  Outcome: Progressing  Flowsheets (Taken 3/8/2023 1600 by Izabela Vidal)  Discharge to home or other facility with appropriate resources:   Identify barriers to discharge with patient and caregiver   Arrange for needed discharge resources and transportation as appropriate  3/8/2023 1201 by Filiberto Bhatti RN  Outcome: Progressing  Flowsheets  Taken 3/8/2023 1153 by Izabela Vidal  Discharge to home or other facility with appropriate resources:   Identify barriers to discharge with patient and caregiver   Arrange for needed discharge resources and transportation as appropriate   Identify discharge learning needs (meds, wound care, etc)  Taken 3/8/2023 0800 by Izabela Vidal  Discharge to home or other facility with appropriate resources:   Identify barriers to discharge with patient and caregiver   Arrange for needed discharge resources and transportation as appropriate  3/8/2023 0301 by Bart Travis RN  Outcome: Progressing  Flowsheets  Taken 3/7/2023 2000 by Bart Travis RN  Discharge to home or other facility with appropriate resources:   Identify barriers to discharge with patient and caregiver   Arrange for needed discharge resources and transportation as appropriate   Identify discharge learning needs (meds, wound care, etc)   Arrange for interpreters to assist at discharge as needed   Refer to discharge planning if patient needs post-hospital services based on physician order or complex needs related to functional status, cognitive ability or social support system  Taken 3/7/2023 1840 by Izabela Vidal  Discharge to home or other facility with appropriate resources:   Identify barriers to discharge with patient and caregiver   Arrange for needed discharge resources and transportation as appropriate  Taken 3/7/2023 1825 by Diana Carr George  Discharge to home or other facility with appropriate resources:   Identify barriers to discharge with patient and caregiver   Arrange for needed discharge resources and transportation as appropriate  Taken 3/7/2023 1810 by Santosh Liz  Discharge to home or other facility with appropriate resources:   Identify barriers to discharge with patient and caregiver   Arrange for needed discharge resources and transportation as appropriate     Problem: Pain  Goal: Verbalizes/displays adequate comfort level or baseline comfort level  3/8/2023 1653 by Consuelo Adams RN  Outcome: Progressing  3/8/2023 1201 by Consuelo Adams RN  Outcome: Progressing  3/8/2023 0301 by Lesley Meek RN  Outcome: Progressing     Problem: ABCDS Injury Assessment  Goal: Absence of physical injury  3/8/2023 1653 by Consuelo Adams RN  Outcome: Progressing  3/8/2023 1201 by Consuelo Adams RN  Outcome: Progressing  3/8/2023 0301 by Lesley Meek RN  Outcome: Progressing     Problem: Safety - Adult  Goal: Free from fall injury  3/8/2023 1653 by Consuelo Adams RN  Outcome: Progressing  3/8/2023 1201 by Consuelo Adams RN  Outcome: Progressing  3/8/2023 0301 by Lesley Meek RN  Outcome: Progressing  Flowsheets (Taken 3/7/2023 1526 by Consuelo Adams RN)  Free From Fall Injury: Based on caregiver fall risk screen, instruct family/caregiver to ask for assistance with transferring infant if caregiver noted to have fall risk factors

## 2023-03-08 NOTE — PROGRESS NOTES
Occupational [address]  Occupational Therapy Not Seen Note    DATE: 3/8/2023    NAME: Mini Alonso  MRN: [id number]   : 1960      Patient not seen this date for Occupational Therapy due to:    Patient is not appropriate for active participation in OT evaluation/treatment at this time d/t R femoral sheath in place. Temporary pace in place, possible PPM placement.      Next Scheduled Treatment: Check back 3/9/2023     Electronically signed by Princess Briggs OT on 3/8/2023 at 8:33 AM

## 2023-03-08 NOTE — PLAN OF CARE
Reached out to Dr. Lelo Marsh for EP consultation. Discussed patient in detail. Recommended to decrease pacer rate to 45 for now to see if requires pacing. Full EP consult to follow tomorrow.       Nikkie Elder fellow

## 2023-03-08 NOTE — PROGRESS NOTES
[facility]   Progress Note                   Date:   3/8/2023  Patient name: Hilda Alvarado  Date of admission:  3/7/2023  9:36 AM  MRN:   [id number]  YOB: 1960  PCP: No primary care provider on file. Reason for Admission:     Subjective:   No issues overnight. Patient under went temporary pacemaker implantation through right femoral.  This morning patient has his own intrinsic rhythm with heart rate in 84. Pacer is set at [de-identified]. Patient in normal sinus rhythm. Hemodynamically stable. Denied any active complaint. Medications:   Scheduled Meds:   sodium chloride flush  5-40 mL IntraVENous 2 times per day    polyethylene glycol  17 g Oral Daily    gabapentin  100 mg Oral Q8H    epoetin shira-epbx  20,000 Units SubCUTAneous Daily    iron sucrose  100 mg IntraVENous Q24H    ceFAZolin  2,000 mg IntraVENous Q8H     Continuous Infusions:   lactated ringers IV soln 125 mL/hr at 03/08/23 0615    sodium chloride       CBC:   Recent Labs     03/07/23  0959 03/08/23  0511   WBC 5.5 9.2   HGB 14.9 14.8    279     BMP:    Recent Labs     03/07/23  0959 03/08/23  0511    137   K 3.3* 4.4   * 103   CO2 22 20   BUN 11 13   CREATININE 0.99 1.33*   GLUCOSE 130* 153*       INR:   Recent Labs     03/07/23  0959 03/08/23  0511   INR 1.0 1.0       Objective:   Vitals: /63   Pulse 87   Temp 99.8 °F (37.7 °C) (Oral)   Resp 16   Ht 5' 9\" (1.753 m)   Wt 191 lb 6.4 oz (86.8 kg)   SpO2 98%   BMI 28.26 kg/m²   General appearance: alert and cooperative with exam  HEENT: Head: Normocephalic, no lesions, without obvious abnormality.   Neck: no adenopathy, no carotid bruit, no JVD, supple, symmetrical, trachea midline and thyroid not enlarged, symmetric, no tenderness/mass/nodules  Lungs: clear to auscultation bilaterally  Heart: regular rate and rhythm, S1, S2 normal, no murmur, click, rub or gallop  Abdomen: soft, non-tender; bowel sounds normal; no masses,  no organomegaly  Extremities: extremities normal, atraumatic, no cyanosis or edema  Neurologic: Mental status: Alert, oriented, thought content appropriate    EKG:    ECHO:  Obtained, pending    CATH:  Not obtained    STRESS:  Not obtained  Assessment:   2: 1 AV block s/p temporary pacemaker implantation through right femoral on 3/7. S/p fall with open fracture of left ankle s/p surgery      Plan:   Patient remained hemodynamically stable. Currently not requiring pacing, heart rate above 80, in NSR. We will obtain an echo to rule out any MWA or structural heart problem as a cause for block  Doubt that patient will need PPM.  Will still get EP evaluation  We will continue to monitor for now. This plan need to be discuss with rounding attending. Hilda Jimenez MD. PGY- 4  Fellow, cardiovascular disease   [place], [place]      Attending Physician Statement  I have discussed the case of Mini Alonso including pertinent history and exam findings with the student/resident/fellow. I have seen and examined the patient and the key elements of the encounter have been performed by me. I agree with the assessment, plan and orders as documented by the resident With changes made to the note.      Electronically signed by Noris Mitchell MD on 3/8/2023 at [facility]      [phone]

## 2023-03-08 NOTE — CARE COORDINATION
SBIRT-  Met with pt this a.m was alert and oriented  Pt denies any drug or alcohol use  Pt does not feel depressed or has any suicidal thoughts  Screenings were negative. Alcohol Screening and Brief Intervention        Recent Labs     03/07/23  0959   ALC <10       Alcohol Pre-screening          Alcohol Screening Audit  Q1: How often do you have a drink containing alcohol?: Never  Q2: How many drinks containing alcohol do you have on a typical day when you are drinking?: Patient does not drink  Q3: How often do you have six or more drinks on one occasion?: Never  Audit-C Score: -1    Drug Pre-Screening   How many times in the past year have you used a recreational drug or used a prescription medication for nonmedical reasons?: None    Drug Screening DAST       Mood Pre-Screening (PHQ-2)  During the past two weeks, have you been bothered by little interest or pleasure in doing things?: No  During the past two weeks, have you been bothered by feeling down, depressed, or hopeless?: No    Mood Pre-Screening (PHQ-9)         I have interviewed Macy Solares, [id number] regarding  His alcohol consumption/drug use and risk for excessive use. Screenings were negative. Patient  N/A intervention at this time.      Deferred []    Completed on: 3/8/2023   [address] [facility]

## 2023-03-08 NOTE — PROGRESS NOTES
ICU PROGRESS NOTE        PATIENT NAME: Meli Denton RECORD NO. [id number]  DATE: 3/8/2023    PRIMARY CARE PHYSICIAN: No primary care provider on file. HD: # 1    ASSESSMENT    Patient Active Problem List   Diagnosis    Open fracture of left ankle    Bradycardia    Open ankle fracture, left, type I or II, with nonunion, subsequent encounter       MEDICAL DECISION MAKING AND PLAN  Neuro:  Tylenol, roxicodone 5mg q 6 PRN, gabapentin   CV  Type II heart block -temporary pacer per cardiology  ECHO today  Pulm  Incentive spirometry   Sating > 95% on RA  GI/Nutrition  Regular diet  Renal/lytes  BUN/Cr: 13 1.33 - monitor Cr  Replace electrolytes PRN  Keep LR at 60cc/hr  Heme  DVT prophylaxis-Hold DVT ppx  Hgb: 14.8  [employer] Witness -refusal of all blood products  Continue venofer and EPO  7.   Endocrine        1. Glucose goal < 180  Musculoskeletal  Medial malleolar fx, distal fibular fx   S/p ORIF left ankle   NWB LLE  Skin  Dressing per orthopedic surgery  Monitor for wound breakdown   Micro  Monitor for fever  Family/dispo  Remain ICU status  Follow up cardiology recommendations for permanent pacer  Lines  Right femoral temporary pacer, PIV      CHECKLIST    CAM-ICU RASS: 0  RESTRAINTS: -  IVF: LR 60cc/hr  NUTRITION: adult diet  ANTIBIOTICS: ancef course completed  GI: -  DVT: hold  GLYCEMIC CONTROL: yes  HOB >45: yes  MOBILITY: bedrest  IS: will evaluate     Chief Complaint: \"My foot hurts\"    SUBJECTIVE    Irineo L Malu  was seen and examined. He is POD#1 from left open bimalleolar fx ORIF with orthopedic surgery and temporary pacer placement with cardiology. Pain is controlled.       OBJECTIVE  VITALS: Temp: Temp: 98.8 °F (37.1 °C)Temp  Av.8 °F (37.1 °C)  Min: 98.2 °F (36.8 °C)  Max: 99.8 °F (18.2 °C) BP Systolic (30ODD), UEK:988 , Min:104 , INM:158   Diastolic (15AXW), TGP:21, Min:63, Max:130   Pulse Pulse  Av.5  Min: 45  Max: 91 Resp Resp  Av.8  Min: 8  Max: 22 Pulse ox SpO2  Avg: 95.8 %  Min: 93 %  Max: 98 %    CONSTITUTIONAL: awake, alert   HEENT: normocephalic, atraumatic   LUNGS: equal chest rise with respirations  CV: paced rhythm   GI: mild abd pain in suprapubic region and LLQ  MUSCULOSKELETAL: Left foot in bulky dressing, compartments of lower limb soft. NEUROLOGIC: moves both extremities, limited ROM to R ankle  SKIN: warm well perfused        Drain/tube output:      LAB:  CBC:   Recent Labs     03/07/23  0959 03/08/23  0511   WBC 5.5 9.2   HGB 14.9 14.8   HCT 45.4 46.2   MCV 84.1 85.2    279     BMP:   Recent Labs     03/07/23  0959 03/08/23  0511    137   K 3.3* 4.4   * 103   CO2 22 20   BUN 11 13   CREATININE 0.99 1.33*   GLUCOSE 130* 153*         RADIOLOGY:  XR TIBIA FIBULA LEFT (2 VIEWS)    Result Date: 3/7/2023  EXAMINATION: 4 XRAY VIEWS OF THE LEFT TIBIA AND FIBULA; THREE XRAY VIEWS OF THE LEFT ANKLE 3/7/2023 6:57 pm COMPARISON: Earlier today HISTORY: ORDERING SYSTEM PROVIDED HISTORY: Trauma/Fracture TECHNOLOGIST PROVIDED HISTORY: In PACU please Trauma/Fracture; ORDERING SYSTEM PROVIDED HISTORY: Post-op PACU TECHNOLOGIST PROVIDED HISTORY: AP, lateral, mortise Post-op PACU FINDINGS: External cast material obscures fine osseous detail. Left ankle: Immediate postoperative changes following medial and lateral malleolar ORIF with syndesmotic fixation device in place. Overall alignment is near anatomic. Small butterfly fracture fragment involving the distal fibula is minimally displaced. Talar dome is intact. Left tibia/fibula: Distal postoperative changes as above. No abnormality of the proximal tibia or fibula. Normal alignment of the knee. 6 mm metallic foreign body in the medial soft tissues adjacent to the tibial plateau. 1. Overall near anatomic alignment of the left ankle following ORIF. 2. 6 mm metallic foreign body in the soft tissues medial to the left tibial plateau.      XR ANKLE LEFT (2 VIEWS)    Result Date: 3/7/2023  EXAMINATION: 1 XRAY VIEWS OF THE LEFT ANKLE 3/7/2023 11:56 am COMPARISON: Left ankle radiographs performed 03/07/2023. HISTORY: ORDERING SYSTEM PROVIDED HISTORY: post reduction TECHNOLOGIST PROVIDED HISTORY: post reduction FINDINGS: There is a minimally displaced medial malleolar fracture. There is a minimally displaced comminuted fracture of the distal fibular metadiaphysis. There is minimal lateral displacement of the ankle mortise. There is soft tissue swelling. There is overlying splint material.     Minimally displaced medial malleolar fracture. Minimally displaced comminuted fracture of the distal fibular metadiaphysis. Minimal lateral displacement of the ankle mortise. Soft tissue swelling. XR ANKLE LEFT (2 VIEWS)    Result Date: 3/7/2023  EXAMINATION: 2 XRAY VIEWS OF THE LEFT ANKLE 3/7/2023 11:56 am COMPARISON: Left ankle radiographs performed 03/07/2023. HISTORY: ORDERING SYSTEM PROVIDED HISTORY: Post-Splint TECHNOLOGIST PROVIDED HISTORY: Post-Splint FINDINGS: There is a nondisplaced medial malleolar fracture. There is a minimally displaced comminuted fracture of the distal fibular metadiaphysis. There is an tonic alignment of the ankle mortise. There is soft tissue swelling. There is overlying splint material.     Nondisplaced medial malleolar fracture. Minimally displaced comminuted distal fibular metadiaphysis fracture. Soft tissue swelling. XR ANKLE LEFT (2 VIEWS)    Result Date: 3/7/2023  EXAMINATION: 2 XRAY VIEWS OF THE LEFT ANKLE 3/7/2023 11:56 am COMPARISON: Left ankle radiographs performed 03/07/2023. HISTORY: ORDERING SYSTEM PROVIDED HISTORY: Post-Reduction TECHNOLOGIST PROVIDED HISTORY: Post-Reduction FINDINGS: There is a nondisplaced medial malleolar fracture. There is a minimally displaced comminuted fracture of the distal fibular metadiaphysis. There is normal alignment of the ankle mortise. There is diffuse soft tissue swelling. Nondisplaced medial malleolar fracture.  Minimally displaced comminuted fracture of the distal fibular metadiaphysis. Soft tissue swelling. XR ANKLE LEFT (MIN 3 VIEWS)    Result Date: 3/7/2023  EXAMINATION: 4 XRAY VIEWS OF THE LEFT TIBIA AND FIBULA; THREE XRAY VIEWS OF THE LEFT ANKLE 3/7/2023 6:57 pm COMPARISON: Earlier today HISTORY: ORDERING SYSTEM PROVIDED HISTORY: Trauma/Fracture TECHNOLOGIST PROVIDED HISTORY: In PACU please Trauma/Fracture; ORDERING SYSTEM PROVIDED HISTORY: Post-op PACU TECHNOLOGIST PROVIDED HISTORY: AP, lateral, mortise Post-op PACU FINDINGS: External cast material obscures fine osseous detail. Left ankle: Immediate postoperative changes following medial and lateral malleolar ORIF with syndesmotic fixation device in place. Overall alignment is near anatomic. Small butterfly fracture fragment involving the distal fibula is minimally displaced. Talar dome is intact. Left tibia/fibula: Distal postoperative changes as above. No abnormality of the proximal tibia or fibula. Normal alignment of the knee. 6 mm metallic foreign body in the medial soft tissues adjacent to the tibial plateau. 1. Overall near anatomic alignment of the left ankle following ORIF. 2. 6 mm metallic foreign body in the soft tissues medial to the left tibial plateau. XR ANKLE LEFT (MIN 3 VIEWS)    Result Date: 3/7/2023  EXAMINATION: THREE XRAY VIEWS OF THE LEFT ANKLE 3/7/2023 9:57 am COMPARISON: None. HISTORY: ORDERING SYSTEM PROVIDED HISTORY: Open left ankle fracture TECHNOLOGIST PROVIDED HISTORY: Open left ankle fracture FINDINGS: There is dislocation of the ankle mortise laterally with overriding. There is a comminuted fracture of the distal fibular metadiaphysis with overriding and angulation. There is soft tissue swelling. Severe dislocation of the ankle mortise laterally with overriding. Comminuted fracture of the distal fibular metadiaphysis with overriding and angulation. Soft tissue swelling and apparent skin breakthrough medially.      CT HEAD WO CONTRAST    Result Date: 3/7/2023  EXAMINATION: CT OF THE HEAD WITHOUT CONTRAST  3/7/2023 1:58 pm TECHNIQUE: CT of the head was performed without the administration of intravenous contrast. Automated exposure control, iterative reconstruction, and/or weight based adjustment of the mA/kV was utilized to reduce the radiation dose to as low as reasonably achievable. COMPARISON: None. HISTORY: ORDERING SYSTEM PROVIDED HISTORY: Fall from truck TECHNOLOGIST PROVIDED HISTORY: Fall from truck Decision Support Exception - unselect if not a suspected or confirmed emergency medical condition->Emergency Medical Condition ([place]) FINDINGS: BRAIN/VENTRICLES: There is no acute intracranial hemorrhage, mass effect, or midline shift. There is satisfactory overall gray-white matter differentiation. The ventricular structures are symmetric and unremarkable. The infratentorial structures are unremarkable. ORBITS: The visualized portion of the orbits demonstrate no acute abnormality. SINUSES: The visualized paranasal sinuses and mastoid air cells demonstrate no acute abnormality. SOFT TISSUES/SKULL:  No acute abnormality of the visualized skull or soft tissues. No acute intracranial abnormality. CT CERVICAL SPINE WO CONTRAST    Result Date: 3/7/2023  EXAMINATION: CT OF THE CERVICAL SPINE WITHOUT CONTRAST; CT OF THE LUMBAR SPINE WITHOUT CONTRAST; CT OF THE THORACIC SPINE WITHOUT CONTRAST 3/7/2023 10:58 am TECHNIQUE: CT of the cervical spine was performed without the administration of intravenous contrast. Multiplanar reformatted images are provided for review. Automated exposure control, iterative reconstruction, and/or weight based adjustment of the mA/kV was utilized to reduce the radiation dose to as low as reasonably achievable.; CT of the lumbar spine was performed without the administration of intravenous contrast. Multiplanar reformatted images are provided for review.   Adjustment of mA and/or kV according to patient size was utilized.  Automated exposure control, iterative reconstruction, and/or weight based adjustment of the mA/kV was utilized to reduce the radiation dose to as low as reasonably achievable.; CT of the thoracic spine was performed without the administration of intravenous contrast. Multiplanar reformatted images are provided for review. Automated exposure control, iterative reconstruction, and/or weight based adjustment of the mA/kV was utilized to reduce the radiation dose to as low as reasonably achievable. COMPARISON: None. HISTORY: ORDERING SYSTEM PROVIDED HISTORY: fall from truck TECHNOLOGIST PROVIDED HISTORY: fall from truck Decision Support Exception - unselect if not a suspected or confirmed emergency medical condition->Emergency Medical Condition (MA); ORDERING SYSTEM PROVIDED HISTORY: fall TECHNOLOGIST PROVIDED HISTORY: fall CT CERVICAL SPINE FINDINGS: The cervical spine demonstrates normal mineralization with straightening of the  cervical lordosis. There is no evidence of fracture or subluxation. There is loss of disc height with eburnation of the vertebral endplates at the C5-6, C6-7 levels.  There are anterior and posterior marginal osteophytes at multiple levels. Mild spinal stenosis at C5-6 and C6-7..  There is bilateral facet hypertrophy at multiple levels throughout the cervical spine. The pedicles and posterior elements are otherwise intact. The prevertebral and paravertebral soft tissues are unremarkable. The atlanto-dens interval and dens are intact. The visualized lung apices are clear.  Vascular calcifications are seen compatible with atherosclerotic disease. CT THORACIC AND LUMBAR SPINE FINDINGS: BONES/ALIGNMENT: Mild dextroscoliosis of the thoracic spine.  There is normal alignment of the  lumbar spine.  The vertebral body heights are maintained. 1 cm intraosseous lucency in the posterior aspect of the right ilium. Smaller similar lucency seen in the lower posterior aspect of the left ilium.  DEGENERATIVE CHANGES:  Degenerative disc disease noted at  L4-L5 and L5-S1 with loss of height and subcortical erosive changes of the articulating endplates. The spinal canal appears to be patent. There are mild posterior marginal osteophytes with associated broad-based disc bulges at these levels with associated facet hypertrophy and ligamentous hypertrophy. The neural foramina appear to remain patent. SOFT TISSUES/RETROPERITONEUM: Punctate nonobstructing bilateral renal stones. No paraspinal mass is seen. Vascular calcifications are seen compatible with atherosclerotic disease. Multilevel cervical spondylosis and degenerative disc disease. Mild spinal stenosis at C5-6 and C6-7 Evidence of paracervical spasm No acute bony abnormalities are noted in the cervical and thoracic spine 1 cm intraosseous lucency in the posterior aspect of the right ilium. Smaller similar lucency seen in the lower posterior aspect of the left ilium. Small lytic lesions cannot be excluded. L4-5 and L5-S1 degenerative disc disease. There are erosive changes of the vertebral endplates and an underlying infectious/inflammatory process cannot be entirely excluded. RECOMMENDATIONS: Further evaluation of the lumbar spine should be obtained with MR imaging if clinically indicated. FLUORO FOR SURGICAL PROCEDURES    Result Date: 3/7/2023  Radiology exam is complete. No Radiologist dictation. Please follow up with ordering provider. CT CHEST ABDOMEN PELVIS WO CONTRAST Additional Contrast? None    Result Date: 3/7/2023  EXAMINATION: CT OF THE CHEST, ABDOMEN, AND PELVIS WITHOUT CONTRAST 3/7/2023 1:58 pm TECHNIQUE: CT of the chest, abdomen and pelvis was performed without the administration of intravenous contrast. Multiplanar reformatted images are provided for review.  Automated exposure control, iterative reconstruction, and/or weight based adjustment of the mA/kV was utilized to reduce the radiation dose to as low as reasonably achievable. COMPARISON: None HISTORY: ORDERING SYSTEM PROVIDED HISTORY: Fall from truck, bradycardia TECHNOLOGIST PROVIDED HISTORY: Fall from truck, bradycardia Decision Support Exception - unselect if not a suspected or confirmed emergency medical condition->Emergency Medical Condition ([place]) FINDINGS: Chest: Mediastinum: Heart size is normal without pericardial effusion. Pacing lead is noted in the right ventricle. No mediastinal hematoma. No mediastinal lymphadenopathy. Lungs/pleura: There is no pleural effusion. There is no pneumothorax. There is no pulmonary contusion or laceration. Subsegmental atelectasis is noted in the dependent lung bases. Soft Tissues/Bones: Thoracic spine findings are better described on same day CT of the thoracic spine. No displaced rib fracture. Lipoma is noted in the right posterior chest soft tissues. Abdomen/Pelvis: Organs: Within the limitations of a noncontrast examination, no acute abnormality within the liver, gallbladder, spleen, pancreas, or adrenal glands. There are bilateral nonobstructing renal stones. No obstructing stone or hydronephrosis. GI/Bowel: The stomach is partially distended. The small bowel is nondilated. The colon is nondilated. The appendix is normal in caliber. Pelvis: The bladder is partially distended with mild wall thickening. No vesicular stone. The prostate is enlarged. Peritoneum/Retroperitoneum: No ascites or pneumoperitoneum. Atherosclerosis in the nondilated abdominal aorta. Bones/Soft Tissues: No acute osseous abnormality. Lumbar spine findings are better described on same day CT. 1. No acute intrathoracic abnormality. 2. No acute intra-abdominal abnormality within the limitations of a noncontrast study. 3. Prostatomegaly. 4. Urinary bladder wall thickening, which may be due to chronic outlet obstruction. 5. Bilateral nonobstructing nephrolithiasis.      CT LUMBAR SPINE TRAUMA RECONSTRUCTION    Result Date: 3/7/2023  EXAMINATION: CT OF THE CERVICAL SPINE WITHOUT CONTRAST; CT OF THE LUMBAR SPINE WITHOUT CONTRAST; CT OF THE THORACIC SPINE WITHOUT CONTRAST 3/7/2023 10:58 am TECHNIQUE: CT of the cervical spine was performed without the administration of intravenous contrast. Multiplanar reformatted images are provided for review. Automated exposure control, iterative reconstruction, and/or weight based adjustment of the mA/kV was utilized to reduce the radiation dose to as low as reasonably achievable.; CT of the lumbar spine was performed without the administration of intravenous contrast. Multiplanar reformatted images are provided for review. Adjustment of mA and/or kV according to patient size was utilized. Automated exposure control, iterative reconstruction, and/or weight based adjustment of the mA/kV was utilized to reduce the radiation dose to as low as reasonably achievable.; CT of the thoracic spine was performed without the administration of intravenous contrast. Multiplanar reformatted images are provided for review. Automated exposure control, iterative reconstruction, and/or weight based adjustment of the mA/kV was utilized to reduce the radiation dose to as low as reasonably achievable. COMPARISON: None. HISTORY: ORDERING SYSTEM PROVIDED HISTORY: fall from truck TECHNOLOGIST PROVIDED HISTORY: fall from truck Decision Support Exception - unselect if not a suspected or confirmed emergency medical condition->Emergency Medical Condition (MA); ORDERING SYSTEM PROVIDED HISTORY: fall TECHNOLOGIST PROVIDED HISTORY: fall CT CERVICAL SPINE FINDINGS: The cervical spine demonstrates normal mineralization with straightening of the  cervical lordosis. There is no evidence of fracture or subluxation. There is loss of disc height with eburnation of the vertebral endplates at the T0-0, Y8-6 levels. There are anterior and posterior marginal osteophytes at multiple levels. Mild spinal stenosis at C5-6 and C6-7. Grant Crape   There is bilateral facet hypertrophy at multiple levels throughout the cervical spine. The pedicles and posterior elements are otherwise intact. The prevertebral and paravertebral soft tissues are unremarkable. The atlanto-dens interval and dens are intact. The visualized lung apices are clear. Vascular calcifications are seen compatible with atherosclerotic disease. CT THORACIC AND LUMBAR SPINE FINDINGS: BONES/ALIGNMENT: Mild dextroscoliosis of the thoracic spine. There is normal alignment of the  lumbar spine. The vertebral body heights are maintained. 1 cm intraosseous lucency in the posterior aspect of the right ilium. Smaller similar lucency seen in the lower posterior aspect of the left ilium. DEGENERATIVE CHANGES:  Degenerative disc disease noted at  L4-L5 and L5-S1 with loss of height and subcortical erosive changes of the articulating endplates. The spinal canal appears to be patent. There are mild posterior marginal osteophytes with associated broad-based disc bulges at these levels with associated facet hypertrophy and ligamentous hypertrophy. The neural foramina appear to remain patent. SOFT TISSUES/RETROPERITONEUM: Punctate nonobstructing bilateral renal stones. No paraspinal mass is seen. Vascular calcifications are seen compatible with atherosclerotic disease. Multilevel cervical spondylosis and degenerative disc disease. Mild spinal stenosis at C5-6 and C6-7 Evidence of paracervical spasm No acute bony abnormalities are noted in the cervical and thoracic spine 1 cm intraosseous lucency in the posterior aspect of the right ilium. Smaller similar lucency seen in the lower posterior aspect of the left ilium. Small lytic lesions cannot be excluded. L4-5 and L5-S1 degenerative disc disease. There are erosive changes of the vertebral endplates and an underlying infectious/inflammatory process cannot be entirely excluded.  RECOMMENDATIONS: Further evaluation of the lumbar spine should be obtained with MR imaging if clinically indicated. CT THORACIC SPINE TRAUMA RECONSTRUCTION    Result Date: 3/7/2023  EXAMINATION: CT OF THE CERVICAL SPINE WITHOUT CONTRAST; CT OF THE LUMBAR SPINE WITHOUT CONTRAST; CT OF THE THORACIC SPINE WITHOUT CONTRAST 3/7/2023 10:58 am TECHNIQUE: CT of the cervical spine was performed without the administration of intravenous contrast. Multiplanar reformatted images are provided for review. Automated exposure control, iterative reconstruction, and/or weight based adjustment of the mA/kV was utilized to reduce the radiation dose to as low as reasonably achievable.; CT of the lumbar spine was performed without the administration of intravenous contrast. Multiplanar reformatted images are provided for review. Adjustment of mA and/or kV according to patient size was utilized. Automated exposure control, iterative reconstruction, and/or weight based adjustment of the mA/kV was utilized to reduce the radiation dose to as low as reasonably achievable.; CT of the thoracic spine was performed without the administration of intravenous contrast. Multiplanar reformatted images are provided for review. Automated exposure control, iterative reconstruction, and/or weight based adjustment of the mA/kV was utilized to reduce the radiation dose to as low as reasonably achievable. COMPARISON: None. HISTORY: ORDERING SYSTEM PROVIDED HISTORY: fall from truck TECHNOLOGIST PROVIDED HISTORY: fall from truck Decision Support Exception - unselect if not a suspected or confirmed emergency medical condition->Emergency Medical Condition (MA); ORDERING SYSTEM PROVIDED HISTORY: fall TECHNOLOGIST PROVIDED HISTORY: fall CT CERVICAL SPINE FINDINGS: The cervical spine demonstrates normal mineralization with straightening of the  cervical lordosis. There is no evidence of fracture or subluxation. There is loss of disc height with eburnation of the vertebral endplates at the G5-5, J5-0 levels.   There are anterior and posterior marginal osteophytes at multiple levels. Mild spinal stenosis at C5-6 and C6-7. Macy Crumble There is bilateral facet hypertrophy at multiple levels throughout the cervical spine. The pedicles and posterior elements are otherwise intact. The prevertebral and paravertebral soft tissues are unremarkable. The atlanto-dens interval and dens are intact. The visualized lung apices are clear. Vascular calcifications are seen compatible with atherosclerotic disease. CT THORACIC AND LUMBAR SPINE FINDINGS: BONES/ALIGNMENT: Mild dextroscoliosis of the thoracic spine. There is normal alignment of the  lumbar spine. The vertebral body heights are maintained. 1 cm intraosseous lucency in the posterior aspect of the right ilium. Smaller similar lucency seen in the lower posterior aspect of the left ilium. DEGENERATIVE CHANGES:  Degenerative disc disease noted at  L4-L5 and L5-S1 with loss of height and subcortical erosive changes of the articulating endplates. The spinal canal appears to be patent. There are mild posterior marginal osteophytes with associated broad-based disc bulges at these levels with associated facet hypertrophy and ligamentous hypertrophy. The neural foramina appear to remain patent. SOFT TISSUES/RETROPERITONEUM: Punctate nonobstructing bilateral renal stones. No paraspinal mass is seen. Vascular calcifications are seen compatible with atherosclerotic disease. Multilevel cervical spondylosis and degenerative disc disease. Mild spinal stenosis at C5-6 and C6-7 Evidence of paracervical spasm No acute bony abnormalities are noted in the cervical and thoracic spine 1 cm intraosseous lucency in the posterior aspect of the right ilium. Smaller similar lucency seen in the lower posterior aspect of the left ilium. Small lytic lesions cannot be excluded. L4-5 and L5-S1 degenerative disc disease. There are erosive changes of the vertebral endplates and an underlying infectious/inflammatory process cannot be entirely excluded. RECOMMENDATIONS: Further evaluation of the lumbar spine should be obtained with MR imaging if clinically indicated. Karen Bird MD  03/08/23 12:16 PM          Trauma Attending Helder Spann      I have reviewed the above [facility] note(s) and confirmed the key elements of the medical history and physical exam. I have seen and examined the pt. I have discussed the findings, established the care plan and recommendations with Resident.       Giovanni Oscar DO  3/8/2023  5:48 PM

## 2023-03-08 NOTE — PROGRESS NOTES
Orthopedic Progress Note     Patient:  Hilda Alvarado  YOB: 1960     57 y.o. male    Subjective  Patient seen and examined at bedside. No complaints or concerns, per patient and nursing. No issue overnight. Pain controlled. Denies fever, HA, CP, SOB, N/V, dysuria.  +flatus, -BM  PT to evaluate and treat today. Vitals reviewed, afebrile. Objective  Vitals:    03/08/23 0400   BP: (!) 127/105   Pulse: 79   Resp: 21   Temp: 99.8 °F (37.7 °C)   SpO2: 96%     Gen: NAD, Cooperative. Cardiovascular: Regular Rate  Respiratory: No Acute Respiratory Distress  MSK:  LLE   Splint on, clean/dry/intact. Compartments soft. EHL/FHL motor intact; TA/GSC testing precluded by splint. SILT to exposed digits. Exposed digits are warm and well perfused with BCR. Recent Labs     03/07/23  0959   WBC 5.5   HGB 14.9   HCT 45.4      INR 1.0      K 3.3*   BUN 11   CREATININE 0.99   GLUCOSE 130*      Meds: See Rec for Complete List    Impression 57 y.o. male being seen for    - Type IIIA open bimalleolar fracture of left ankle and left ankle syndesmotic injury s/p I&D, ORIF, POD#1    Plan  - No plans for further orthopaedic intervention at this time. - Post-Op HgB pending for morning lab draws  - CT examination of the pelvis reveals scattered iliac lytic lesions. Concern for possible multiple myeloma, recommend workup. - WB status: NWB LLE  - Splint on LLE, please maintain. - Primary Team: Trauma Surgery    - Pain / Medical Management:  Per primary team    - Medical Management: Per primary team  - DVT ppx:  Per primary team    - Ice/Elevate for Pain and Swelling  - Encourage Incentive Spirometry use  - PT/OT  - Dispo: ok to discharge from orthopaedic perspective, pending PT/OT evaluation and if medically stable. - F/u with Dr. Berenice Cruz in 14 days.  - Please page the On Call Ortho resident with any questions. _________________________________  Concha Barbosa D.O.   Orthopedic Surgery Resident, PGY-1  [facility]

## 2023-03-08 NOTE — PROGRESS NOTES
[employer] of Workers Compensation FROI form completed and faxed to Utilization Review at [phone]. A copy of the form has been placed in the patient's chart and  aware.

## 2023-03-08 NOTE — PLAN OF CARE
Problem: Discharge Planning  Goal: Discharge to home or other facility with appropriate resources  3/8/2023 1201 by Samantha Shaw RN  Outcome: Progressing  Flowsheets  Taken 3/8/2023 1153 by Lexington Poly [employer]ptive  Discharge to home or other facility with appropriate resources:   Identify barriers to discharge with patient and caregiver   Arrange for needed discharge resources and transportation as appropriate   Identify discharge learning needs (meds, wound care, etc)  Taken 3/8/2023 0800 by Lexington Meals  Discharge to home or other facility with appropriate resources:   Identify barriers to discharge with patient and caregiver   Arrange for needed discharge resources and transportation as appropriate  3/8/2023 0301 by Vane Way, RN  Outcome: Progressing  Flowsheets  Taken 3/7/2023 2000 by Vane Way, RN  Discharge to home or other facility with appropriate resources:   Identify barriers to discharge with patient and caregiver   Arrange for needed discharge resources and transportation as appropriate   Identify discharge learning needs (meds, wound care, etc)   Arrange for interpreters to assist at discharge as needed   Refer to discharge planning if patient needs post-hospital services based on physician order or complex needs related to functional status, cognitive ability or social support system  Taken 3/7/2023 1840 by Jim Meals  Discharge to home or other facility with appropriate resources:   Identify barriers to discharge with patient and caregiver   Arrange for needed discharge resources and transportation as appropriate  Taken 3/7/2023 1825 by Lexington Meals  Discharge to home or other facility with appropriate resources:   Identify barriers to discharge with patient and caregiver   Arrange for needed discharge resources and transportation as appropriate  Taken 3/7/2023 1810 by Lexington Meals  Discharge to home or other facility with appropriate resources:   Identify barriers to discharge with patient and caregiver   Arrange for needed discharge resources and transportation as appropriate     Problem: Pain  Goal: Verbalizes/displays adequate comfort level or baseline comfort level  3/8/2023 1201 by Christa Dawkins RN  Outcome: Progressing  3/8/2023 0301 by Young Cheema RN  Outcome: Progressing     Problem: ABCDS Injury Assessment  Goal: Absence of physical injury  3/8/2023 1201 by Christa Dawkins RN  Outcome: Progressing  3/8/2023 0301 by Young Cheema RN  Outcome: Progressing     Problem: Safety - Adult  Goal: Free from fall injury  3/8/2023 1201 by Christa Dawkins RN  Outcome: Progressing  3/8/2023 0301 by Young Cheema RN  Outcome: Progressing  Flowsheets (Taken 3/7/2023 1526 by Christa Dawkins RN)  Free From Fall Injury: Based on caregiver fall risk screen, instruct family/caregiver to ask for assistance with transferring infant if caregiver noted to have fall risk factors

## 2023-03-09 LAB
ABSOLUTE EOS #: <0.03 K/UL (ref 0–0.44)
ABSOLUTE IMMATURE GRANULOCYTE: 0.09 K/UL (ref 0–0.3)
ABSOLUTE LYMPH #: 1.58 K/UL (ref 1.1–3.7)
ABSOLUTE MONO #: 0.96 K/UL (ref 0.1–1.2)
ALBUMIN SERPL-MCNC: 3.6 G/DL (ref 3.5–5.2)
ALBUMIN/GLOBULIN RATIO: 1.3 (ref 1–2.5)
ALP SERPL-CCNC: 92 U/L (ref 40–129)
ALT SERPL-CCNC: 12 U/L (ref 5–41)
ANION GAP SERPL CALCULATED.3IONS-SCNC: 8 MMOL/L (ref 9–17)
AST SERPL-CCNC: 26 U/L
BASOPHILS # BLD: 0 % (ref 0–2)
BASOPHILS ABSOLUTE: 0.03 K/UL (ref 0–0.2)
BILIRUB SERPL-MCNC: 0.3 MG/DL (ref 0.3–1.2)
BUN SERPL-MCNC: 19 MG/DL (ref 8–23)
CALCIUM SERPL-MCNC: 8.7 MG/DL (ref 8.6–10.4)
CHLORIDE SERPL-SCNC: 106 MMOL/L (ref 98–107)
CO2 SERPL-SCNC: 26 MMOL/L (ref 20–31)
CREAT SERPL-MCNC: 1.36 MG/DL (ref 0.7–1.2)
EOSINOPHILS RELATIVE PERCENT: 0 % (ref 1–4)
GFR SERPL CREATININE-BSD FRML MDRD: 58 ML/MIN/1.73M2
GLUCOSE SERPL-MCNC: 120 MG/DL (ref 70–99)
HCT VFR BLD AUTO: 39.2 % (ref 40.7–50.3)
HGB BLD-MCNC: 13.1 G/DL (ref 13–17)
IMMATURE GRANULOCYTES: 1 %
LYMPHOCYTES # BLD: 17 % (ref 24–43)
MCH RBC QN AUTO: 27.6 PG (ref 25.2–33.5)
MCHC RBC AUTO-ENTMCNC: 33.4 G/DL (ref 28.4–34.8)
MCV RBC AUTO: 82.5 FL (ref 82.6–102.9)
MONOCYTES # BLD: 11 % (ref 3–12)
NRBC AUTOMATED: 0 PER 100 WBC
PDW BLD-RTO: 13.4 % (ref 11.8–14.4)
PLATELET # BLD AUTO: 198 K/UL (ref 138–453)
PMV BLD AUTO: 10.1 FL (ref 8.1–13.5)
POTASSIUM SERPL-SCNC: 4.1 MMOL/L (ref 3.7–5.3)
PROT SERPL-MCNC: 6.3 G/DL (ref 6.4–8.3)
RBC # BLD: 4.75 M/UL (ref 4.21–5.77)
RBC # BLD: ABNORMAL 10*6/UL
SEG NEUTROPHILS: 71 % (ref 36–65)
SEGMENTED NEUTROPHILS ABSOLUTE COUNT: 6.46 K/UL (ref 1.5–8.1)
SODIUM SERPL-SCNC: 140 MMOL/L (ref 135–144)
WBC # BLD AUTO: 9.1 K/UL (ref 3.5–11.3)

## 2023-03-09 PROCEDURE — 6360000002 HC RX W HCPCS

## 2023-03-09 PROCEDURE — 2580000003 HC RX 258: Performed by: STUDENT IN AN ORGANIZED HEALTH CARE EDUCATION/TRAINING PROGRAM

## 2023-03-09 PROCEDURE — 99232 SBSQ HOSP IP/OBS MODERATE 35: CPT | Performed by: INTERNAL MEDICINE

## 2023-03-09 PROCEDURE — 6370000000 HC RX 637 (ALT 250 FOR IP): Performed by: NURSE PRACTITIONER

## 2023-03-09 PROCEDURE — 36415 COLL VENOUS BLD VENIPUNCTURE: CPT

## 2023-03-09 PROCEDURE — 6370000000 HC RX 637 (ALT 250 FOR IP): Performed by: STUDENT IN AN ORGANIZED HEALTH CARE EDUCATION/TRAINING PROGRAM

## 2023-03-09 PROCEDURE — 85025 COMPLETE CBC W/AUTO DIFF WBC: CPT

## 2023-03-09 PROCEDURE — 80053 COMPREHEN METABOLIC PANEL: CPT

## 2023-03-09 PROCEDURE — 2000000000 HC ICU R&B

## 2023-03-09 RX ORDER — HEPARIN SODIUM 5000 [USP'U]/ML
5000 INJECTION, SOLUTION INTRAVENOUS; SUBCUTANEOUS EVERY 8 HOURS SCHEDULED
Status: DISCONTINUED | OUTPATIENT
Start: 2023-03-09 | End: 2023-03-10 | Stop reason: HOSPADM

## 2023-03-09 RX ADMIN — SENNOSIDES 8.6 MG: 8.6 TABLET, COATED ORAL at 11:24

## 2023-03-09 RX ADMIN — HEPARIN SODIUM 5000 UNITS: 5000 INJECTION INTRAVENOUS; SUBCUTANEOUS at 22:24

## 2023-03-09 RX ADMIN — OXYCODONE HYDROCHLORIDE 5 MG: 5 TABLET ORAL at 01:41

## 2023-03-09 RX ADMIN — GABAPENTIN 100 MG: 100 CAPSULE ORAL at 20:56

## 2023-03-09 RX ADMIN — GABAPENTIN 100 MG: 100 CAPSULE ORAL at 04:16

## 2023-03-09 RX ADMIN — OXYCODONE HYDROCHLORIDE 5 MG: 5 TABLET ORAL at 20:56

## 2023-03-09 RX ADMIN — ACETAMINOPHEN 1000 MG: 500 TABLET ORAL at 20:56

## 2023-03-09 RX ADMIN — ACETAMINOPHEN 1000 MG: 500 TABLET ORAL at 14:13

## 2023-03-09 RX ADMIN — ACETAMINOPHEN 1000 MG: 500 TABLET ORAL at 06:02

## 2023-03-09 RX ADMIN — GABAPENTIN 100 MG: 100 CAPSULE ORAL at 11:24

## 2023-03-09 RX ADMIN — HEPARIN SODIUM 5000 UNITS: 5000 INJECTION INTRAVENOUS; SUBCUTANEOUS at 14:13

## 2023-03-09 RX ADMIN — SODIUM CHLORIDE, PRESERVATIVE FREE 10 ML: 5 INJECTION INTRAVENOUS at 20:57

## 2023-03-09 ASSESSMENT — PAIN DESCRIPTION - LOCATION: LOCATION: ANKLE

## 2023-03-09 ASSESSMENT — PAIN SCALES - GENERAL
PAINLEVEL_OUTOF10: 5
PAINLEVEL_OUTOF10: 6
PAINLEVEL_OUTOF10: 8
PAINLEVEL_OUTOF10: 7

## 2023-03-09 NOTE — CONSULTS
[facility]  Inpatient Cardiology Consult             Date:   3/9/2023  Patient name: Henok Vides  Date of admission:  3/7/2023  9:36 AM  MRN:   [id number]  YOB: 1960      Reason for Consultation:  Second degree heart block    CHIEF COMPLAINT:  s/p mechanical fall     History Obtained From:  Patient and medical record    HISTORY OF PRESENT ILLNESS:    This patient 57y.o. years old with no past medical history given on file initially presented to the ER status post fall with left ankle fracture. Patient HR was in 40's. EKG obtained showed 2nd degree AV block, Mobitz I, with brief periods of 2:1 AV block, asymptomatic with HR 44 bpm.  Prior to ORIF of the left ankle yesterday, he underwent temporary pacemaker placement. Overnight, he demonstrated AV Wenckebach with single paced escape complexes at back-up rate of 45 ppm., with average HR 55-60 bpm.  He has no h/o syncope, lightheadedness, CP, SOB or any exertional intolerance, with EKG showing normal baseline KS interval and QRS. 2D echo is pending. Past Medical History:   has no past medical history on file. Past Surgical History:   has a past surgical history that includes Ankle fracture surgery (Left, 3/7/2023). Home Medications:    Prior to Admission medications    Medication Sig Start Date End Date Taking? Authorizing Provider   vitamin D (ERGOCALCIFEROL) 1.25 MG (89374 UT) CAPS capsule Take 1 capsule by mouth once a week for 8 doses 3/7/23 4/26/23 Yes Isabell Dawn DO       Allergies:  Patient has no known allergies. Social History:        Family History:   Positive for early CAD    REVIEW OF SYSTEMS:    Constitutional: there has been no unanticipated weight loss. There's been No change in energy level, No change in activity level. Eyes: No visual changes or diplopia. No scleral icterus. ENT: No Headaches, hearing loss or vertigo. No mouth sores or sore throat.   Cardiovascular: No problem  Respiratory: No previous reported problems  Gastrointestinal: No abdominal pain, appetite loss, blood in stools. No change in bowel or bladder habits. Genitourinary: No dysuria, trouble voiding, or hematuria. Musculoskeletal:  No gait disturbance, No weakness or joint complaints. Integumentary: No rash or pruritis. Neurological: No headache, diplopia, change in muscle strength, numbness or tingling. No change in gait, balance, coordination, mood, affect, memory, mentation, behavior. Psychiatric: No anxiety, or depression. Endocrine: No temperature intolerance. No excessive thirst, fluid intake, or urination. No tremor. Hematologic/Lymphatic: No abnormal bruising or bleeding, blood clots or swollen lymph nodes. Allergic/Immunologic: No nasal congestion or hives. PHYSICAL EXAM:    Physical Examination:    BP (!) 160/71   Pulse 60   Temp 99.6 °F (37.6 °C) (Oral)   Resp 17   Ht 5' 9\" (1.753 m)   Wt 200 lb 4.8 oz (90.9 kg)   SpO2 94%   BMI 29.58 kg/m²    Constitutional and General Appearance: alert, cooperative, no distress and appears stated age  HEENT: PERRL, no cervical lymphadenopathy. No masses palpable. Normal oral mucosa  Respiratory:  Normal excursion and expansion without use of accessory muscles  Resp Auscultation: Good respiratory effort. No for increased work of breathing. On auscultation: clear to auscultation bilaterally  Cardiovascular: The apical impulse is not displaced  Heart tones are crisp and normal. regular S1 and S2. Murmurs:  None  Jugular venous pulsation Normal  The carotid upstroke is normal in amplitude and contour without delay or bruit  Peripheral pulses are symmetrical and full   Abdomen:  No masses or tenderness  Bowel sounds present  Extremities:   No Cyanosis or Clubbing   Lower extremity edema: Yes   Skin: Warm and dry  Neurological:  Alert and oriented.   Moves all extremities well  No abnormalities of mood, affect, memory, mentation, or behavior are noted    DATA:    Diagnostics:      EKG:  Sinus rhythm with Mobitz I block and 2:1 AVB; LVH      Labs:     CBC:   Recent Labs     03/08/23  0511 03/09/23  0301   WBC 9.2 9.1   HGB 14.8 13.1   HCT 46.2 39.2*    198     BMP:   Recent Labs     03/08/23  0511 03/09/23  0301    140   K 4.4 4.1   CO2 20 26   BUN 13 19   CREATININE 1.33* 1.36*   LABGLOM 60* 58*   GLUCOSE 153* 120*     BNP: No results for input(s): BNP in the last 72 hours. PT/INR:   Recent Labs     03/07/23  0959 03/08/23  0511   PROTIME 10.9 11.1   INR 1.0 1.0     APTT:  Recent Labs     03/07/23  0959 03/08/23  0511   APTT 18.2* 24.3     CARDIAC ENZYMES:No results for input(s): CKTOTAL, CKMB, CKMBINDEX, TROPONINI in the last 72 hours. FASTING LIPID PANEL:No results found for: HDL, LDLDIRECT, LDLCALC, TRIG  LIVER PROFILE:  Recent Labs     03/09/23  0301   AST 26   ALT 12   LABALBU 3.6         IMPRESSION:    Asymptomatic second degree AVB, Type I  S/p mechanical fall with left ankle fracture, s/p ORIF 3/7/23  Essential HTN with LVH; 2D echo pending    Patient Active Problem List   Diagnosis    Open fracture of left ankle    Bradycardia    Open ankle fracture, left, type I or II, with nonunion, subsequent encounter       RECOMMENDATIONS:  OK to discontinue temporary pacing  Will plan for outpatient  follow-up with Holter monitor    Discussed with patient and nursing.     Electronically signed by Nasim Benitez MD on 3/9/2023 at 7:47 AM.  [place] [facility]

## 2023-03-09 NOTE — CARE COORDINATION
Claim number  [id number]    [facility] Name  [facility]    Number  [id number]    Address  [address]  [employer] number    [phone]    Fax number    [phone]

## 2023-03-09 NOTE — PROGRESS NOTES
Physical Therapy Cancel Note      DATE: 3/9/2023    NAME: Ángel Tariq  MRN: [id number]   : 1960      Patient not seen this date for Physical Therapy due to: Other: pt has temporary pacemaker through the groin; RN requested to hold mobility this date; will check 3/10 and mobilize as appropriate.         Electronically signed by Quinn Lim PT on 3/9/2023 at 11:08 AM

## 2023-03-09 NOTE — PROGRESS NOTES
at bedside to remove temporary pacer; pt tolerated well. Venous sheath removed by writer and pacer removed; pressure held for 10 mn. No hematoma noted.

## 2023-03-09 NOTE — PROGRESS NOTES
PROGRESS NOTE      PATIENT NAME: Meli Denton RECORD NO. [id number]  DATE: 3/9/2023    HD: # 2      Patient Active Problem List   Diagnosis    Open fracture of left ankle    Bradycardia    Open ankle fracture, left, type I or II, with nonunion, subsequent encounter       DIAGNOSIS AND PLAN  Neuro:  Tylenol, roxicodone 5mg q 6 PRN, gabapentin 100 mg Q8 hours    CV  Type II heart block -temporary pacer per cardiology    ECHO showed:  Technically difficult study. All LV walls were not well visualized. Overall LVEF appears to be preserved  Estimated LVEF 50--55%  No significant valvular regurgitation or stenosis seen. No significant pericardial effusion is seen. HDS, pacer set at 45 (did require pacing multiple times overnight)    Pending EP evaluation  Cardiology following    Pulm  Incentive spirometry   Sating > 95% on RA    GI/Nutrition  Regular adult diet    Renal/lytes  BUN/Cr: 19/ 1.36 - monitor Cr  Replace electrolytes PRN  Keep LR at 60cc/hr  UOP 1100 mL, no jo    Heme  DVT prophylaxis-Heparin 5000 mg TID  Hgb: 13.1  [employer] -refusal of all blood products  Discontinued venofer and epo per pharmacy's recommedations due to high Hgb. 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 .    Obtain myeloma work-up  Heme following. 7.   Endocrine        1. Glucose goal < 180            Currently: 120    Musculoskeletal: type 3A open bimalleolar fracture of left ankle and left ankle syndesmotic injury, s/p I&D, ORIF, POD 2  Medial malleolar fx, distal fibular fx   S/p ORIF left ankle   NWB LLE  Splint on LLE, maintain  ICE/elevate for pain/swelling  PT/OT  F/u with Dr. Flaco Jackson in 14 days  [id number] [address] Jerez to discharge from ortho perspective pending PT/OT eval and if medically stable.     Skin  Dressing per orthopedic surgery  Monitor for wound breakdown     Micro  Monitor for fever, afebrile Tmax 99.6, completed ancef. Family/dispo  Remain ICU status  Follow up cardiology recommendations for permanent pacer    Lines  Right femoral temporary pacer, PIV               Chief Complaint: LLE ORIF and Type II Heart Block. SUBJECTIVE  The patient had no major overnight complaints, denied any chest pain, feelings of palpitations. Rated his leg pain a 4/10. Not in acute distress. OBJECTIVE  VITALS:   Vitals:    03/09/23 0600   BP: (!) 160/71   Pulse: 60   Resp: 17   Temp:    SpO2: 94%     Physical Exam  HENT:      Head: Normocephalic and atraumatic. Mouth/Throat:      Mouth: Mucous membranes are moist.   Eyes:      Extraocular Movements: Extraocular movements intact. Conjunctiva/sclera: Conjunctivae normal.      Pupils: Pupils are equal, round, and reactive to light. Cardiovascular:      Rate and Rhythm: Bradycardia present. Pulses: Normal pulses. Heart sounds: No murmur heard. No friction rub. No gallop. Pulmonary:      Effort: Pulmonary effort is normal. No respiratory distress. Breath sounds: Normal breath sounds. Abdominal:      General: Abdomen is flat. Palpations: Abdomen is soft. Musculoskeletal:         General: Signs of injury (Wrapped LLE injury. ) present. Normal range of motion. Cervical back: Normal range of motion. No rigidity. Skin:     General: Skin is warm and dry. Capillary Refill: Capillary refill takes less than 2 seconds. Neurological:      Mental Status: He is alert.        LAB:  CBC:   Recent Labs     03/07/23  0959 03/08/23  0511 03/09/23  0301   WBC 5.5 9.2 9.1   HGB 14.9 14.8 13.1   HCT 45.4 46.2 39.2*   MCV 84.1 85.2 82.5*    279 198     BMP:   Recent Labs     03/07/23  0959 03/08/23  0511 03/09/23  0301    137 140   K 3.3* 4.4 4.1   * 103 106   CO2 22 20 26   BUN 11 13 19   CREATININE 0.99 1.33* 1.36*   GLUCOSE 130* 153* 120*       RADIOLOGY:  EKG 3/7: Sinus rhythm with 2:1 AV block  Minimal voltage criteria for LVH, may be normal variant  Septal infarct , age undetermined  Abnormal ECG    CT head 3/7/23: No acute intracranial abnormality. Echo 3/8/23:   Technically difficult study. All LV walls were not well visualized. Overall LVEF appears to be preserved  Estimated LVEF 50--55%  No significant valvular regurgitation or stenosis seen. No significant pericardial effusion is seen. Jesús Block MD  3/9/2023, 7:55 AM               Trauma Attending Tj Minaya      I have reviewed the above [facility] note(s) and confirmed the key elements of the medical history and physical exam. I have seen and examined the pt. I have discussed the findings, established the care plan and recommendations with Resident.       Shakeel Redding DO  3/9/2023  2:50 PM

## 2023-03-09 NOTE — PROGRESS NOTES
Orthopedic Progress Note     Patient:  Alex Jackson  YOB: 1960     57 y.o. male    Subjective  Patient seen and examined at bedside this morning. No complaints or concerns, per patient and nursing. One episode of increased pain in LLE last night; patient was denying application of ice therapy. Pain controlled. Denies fever, HA, CP, SOB, N/V, dysuria.  +flatus, -BM  Still pending PT evaluation. Vitals reviewed, afebrile. Objective  Vitals:    03/09/23 0400   BP: (!) 165/94   Pulse: 57   Resp: 21   Temp:    SpO2: 94%     Gen: NAD, Cooperative. Cardiovascular: Regular Rate  Respiratory: No Acute Respiratory Distress  MSK:  LLE   Splint on, clean/dry/intact. Compartments soft. EHL/FHL motor intact; TA/GSC testing precluded by splint. SILT to exposed digits. Exposed digits are warm and well perfused with BCR. Recent Labs     03/08/23  0511 03/09/23  0301   WBC 9.2 9.1   HGB 14.8 13.1   HCT 46.2 39.2*    198   INR 1.0  --     140   K 4.4 4.1   BUN 13 19   CREATININE 1.33* 1.36*   GLUCOSE 153* 120*      Meds: See Rec for Complete List    Impression 57 y.o. male being seen for    - Type IIIA open bimalleolar fracture of left ankle and left ankle syndesmotic injury s/p I&D, ORIF, POD#2    Plan  - No plans for further orthopaedic intervention at this time. - Post-Op HgB 14.8 (3/8/23 @ 0511  - Hematology consultation onboard per primary team, evaluating pelvic lytic lesions concerning for Multiple Myeloma. - WB status: NWB LLE  - Splint on LLE, please maintain. - Primary Team: Trauma Surgery    - Pain / Medical Management:  Per primary team    - DVT ppx:  Per primary team  - Ice/Elevate for Pain and Swelling  - Encourage Incentive Spirometry use  - PT/OT  - Dispo: ok to discharge from orthopaedic perspective, pending PT/OT evaluation and if medically stable.    - F/u with Dr. Chantelle Stevens in 14 days.  - Please page the On Call Ortho resident with any questions. _________________________________  Norma Tolbert D.O.   Orthopedic Surgery Resident, PGY-1  [facility]

## 2023-03-09 NOTE — PLAN OF CARE
Problem: Discharge Planning  Goal: Discharge to home or other facility with appropriate resources  3/9/2023 0635 by Jordana Palm RN  Outcome: Progressing  3/8/2023 1653 by Alvin Jacobs RN  Outcome: Progressing  Flowsheets (Taken 3/8/2023 1600 by Blessing Duffy)  Discharge to home or other facility with appropriate resources:   Identify barriers to discharge with patient and caregiver   Arrange for needed discharge resources and transportation as appropriate     Problem: Pain  Goal: Verbalizes/displays adequate comfort level or baseline comfort level  3/9/2023 0635 by Jordana Palm RN  Outcome: Progressing  3/8/2023 1653 by Alvin Jacobs RN  Outcome: Progressing     Problem: ABCDS Injury Assessment  Goal: Absence of physical injury  3/9/2023 0635 by Jordana Palm RN  Outcome: Progressing  3/8/2023 1653 by Alvin Jacobs RN  Outcome: Progressing     Problem: Safety - Adult  Goal: Free from fall injury  3/9/2023 0635 by Jordana Palm RN  Outcome: Progressing  3/8/2023 1653 by Alvin Jacobs RN  Outcome: Progressing

## 2023-03-09 NOTE — PLAN OF CARE
Problem: Discharge Planning  Goal: Discharge to home or other facility with appropriate resources  3/9/2023 0941 by Diamond Alfaro RN  Outcome: Progressing  3/9/2023 0635 by Nicholas Hdz RN  Outcome: Progressing     Problem: Pain  Goal: Verbalizes/displays adequate comfort level or baseline comfort level  3/9/2023 0941 by Diamond Alfaro RN  Outcome: Progressing  3/9/2023 0635 by Nicholas Hdz RN  Outcome: Progressing     Problem: ABCDS Injury Assessment  Goal: Absence of physical injury  3/9/2023 0635 by Nicholas Hdz RN  Outcome: Progressing     Problem: Safety - Adult  Goal: Free from fall injury  3/9/2023 0941 by Diamond Alfaro RN  Outcome: Progressing  3/9/2023 0635 by Nicholas Hdz RN  Outcome: Progressing

## 2023-03-09 NOTE — PROGRESS NOTES
[facility]   Progress Note                   Date:   3/9/2023  Patient name: Edi Scott  Date of admission:  3/7/2023  9:36 AM  MRN:   [id number]  YOB: 1960  PCP: No primary care provider on file. Reason for Admission:     Subjective:   No issues overnight. Patient pacer rate was decreased to 45 yesterday and patient did require pacing multiple times. EP has been consulted yesterday evaluation pending. Patient denied any active complaint. Medications:   Scheduled Meds:   acetaminophen  1,000 mg Oral 3 times per day    senna  1 tablet Oral Daily    sodium chloride flush  5-40 mL IntraVENous 2 times per day    polyethylene glycol  17 g Oral Daily    gabapentin  100 mg Oral Q8H    epoetin shira-epbx  20,000 Units SubCUTAneous Daily    iron sucrose  100 mg IntraVENous Q24H     Continuous Infusions:   lactated ringers IV soln 60 mL/hr at 03/08/23 1730    sodium chloride       CBC:   Recent Labs     03/07/23  0959 03/08/23  0511 03/09/23  0301   WBC 5.5 9.2 9.1   HGB 14.9 14.8 13.1    279 198       BMP:    Recent Labs     03/07/23  0959 03/08/23  0511 03/09/23  0301    137 140   K 3.3* 4.4 4.1   * 103 106   CO2 22 20 26   BUN 11 13 19   CREATININE 0.99 1.33* 1.36*   GLUCOSE 130* 153* 120*         INR:   Recent Labs     03/07/23  0959 03/08/23  0511   INR 1.0 1.0         Objective:   Vitals: BP (!) 160/71   Pulse 60   Temp 99.6 °F (37.6 °C) (Oral)   Resp 17   Ht 5' 9\" (1.753 m)   Wt 200 lb 4.8 oz (90.9 kg)   SpO2 94%   BMI 29.58 kg/m²   General appearance: alert and cooperative with exam  HEENT: Head: Normocephalic, no lesions, without obvious abnormality.   Neck: no adenopathy, no carotid bruit, no JVD, supple, symmetrical, trachea midline and thyroid not enlarged, symmetric, no tenderness/mass/nodules  Lungs: clear to auscultation bilaterally  Heart: regular rate and rhythm, S1, S2 normal, no murmur, click, rub or gallop  Abdomen: soft, non-tender; bowel sounds normal; no masses,  no organomegaly  Extremities: No edema    EKG:    ECHO:  Obtained, pending read    CATH:  Not obtained    STRESS:  Not obtained  Assessment:   2: 1 AV block s/p temporary pacemaker implantation through right femoral on 3/7. S/p fall with open fracture of left ankle s/p surgery    Plan:   Patient remained hemodynamically stable. Pacer rate set at 45 yesterday and patient did not require pacing overnight. Follow-up on echo read. EP evaluation pending. We will follow    This plan need to be discuss with rounding attending. Analilia Fournier MD. PGY- 4  Fellow, cardiovascular disease   [place], [place]      Attending Physician Statement  I have discussed the case of Chavo Mariscal including pertinent history and exam findings with the student/resident/fellow. I have seen and examined the patient and the key elements of the encounter have been performed by me. I agree with the assessment, plan and orders as documented by the resident With changes made to the note. Electronically signed by Kirti Crowe MD on 3/9/2023 at [place] [address] [facility]      [phone]         Attending note,   The patient was seen and examined agree with above. In normal sinus rhythm sinus bradycardia. Dr. Thomas Walker note reviewed. No plans for permanent pacing at this time. We will continue current medications we will remove the temporary pacemaker. Holter as an outpatient.

## 2023-03-09 NOTE — DISCHARGE INSTR - COC
Continuity of Care Form    Patient Name: Henok Vides   :  1960  MRN:  [id number]    Admit date:  3/7/2023  Discharge date:  ***    Code Status Order: Full Code   Advance Directives:     Admitting Physician:  Sabina Wolf MD  PCP: No primary care provider on file. Discharging Nurse: [facility] Unit/Room#: 1021/1021-01  Discharging Unit Phone Number: ***    Emergency Contact:   Extended Emergency Contact Information  Primary Emergency Contact: [address] [place] [address] Phone: [phone]  Mobile Phone: [phone]  Relation: Brother/Sister  Secondary Emergency Contact: monroe gramajo  Mobile Phone: [phone]  Relation: Brother/Sister  Preferred language: English   needed?  No    Past Surgical History:  Past Surgical History:   Procedure Laterality Date    ANKLE FRACTURE SURGERY Left 3/7/2023    LEFT ANKLE OPEN REDUCTION INTERNAL FIXATION, BIMALLEOLAR FRACTURE , LEFT SYNDESMOSIS, IRRIGATION AND DEBRIDEMENT OF LEFT OPEN MEDIAL MALLEOLAR FRACTURE , STRESS OF LEFT ANKLE SYNDESMOSIS  UNDER ANESTHESIA , WITH INDEPENDENT IMAGAING INTERPRETATION CLOSURE  OF COMPLEX WOUND 6CM   C-ARM, ARTHREX performed by Laura Beth DO at [address] [place] [address] History:   Immunization History   Administered Date(s) Administered    COVID-19, PFIZER PURPLE top, DILUTE for use, (age 15 y+), 30mcg/0.3mL 10/16/2021, 2022    Tdap (Boostrix, Adacel) 2023       Active Problems:  Patient Active Problem List   Diagnosis Code    Open fracture of left ankle S82.892B    Bradycardia R00.1    Open ankle fracture, left, type I or II, with nonunion, subsequent encounter S82.892M       Isolation/Infection:   Isolation            No Isolation          Patient Infection Status       None to display            Nurse Assessment:  Last Vital Signs: BP (!) 160/71   Pulse 60   Temp 99.6 °F (37.6 °C) (Oral)   Resp 17   Ht 5' 9\" (1.753 m)   Wt 200 lb 4.8 oz (90.9 kg)   SpO2 94%   BMI 29.58 kg/m²     Last documented pain score (0-10 scale): Pain Level: 5  Last Weight:   Wt Readings from Last 1 Encounters:   23 200 lb 4.8 oz (90.9 kg)     Mental Status:  {IP PT MENTAL STATUS:}    IV Access:  { BRIANA IV ACCESS:108269144}    Nursing Mobility/ADLs:  Walking   {P DME MNNF:079058242}  Transfer  {P DME QJGY:460230651}  Bathing  {P DME JKYN:289395802}  Dressing  {CHP DME QNHM:082639654}  Toileting  {CHP DME DYPK:599049411}  Feeding  {[facility] DME MBVL:014073112}  Med Admin  {[facility] DME DOA}  Med Delivery   { BRIANA MED Delivery:215078782}    Wound Care Documentation and Therapy:  Incision 23 Ankle Anterior;Left;Medial (Active)   Dressing Status Clean;Dry; Intact 23 08   Incision Cleansed Not Cleansed 23 0800   Dressing/Treatment Cast 23 0800   Closure Other (Comment) 23 0800   Drainage Amount None 23 0800   Number of days: 1        Elimination:  Continence: Bowel: {YES / HJ:89154}  Bladder: {YES / EY:38669}  Urinary Catheter: {Urinary Catheter:103802038}   Colostomy/Ileostomy/Ileal Conduit: {YES / OV:61817}       Date of Last BM: ***    Intake/Output Summary (Last 24 hours) at 3/9/2023 1127  Last data filed at 3/9/2023 0928  Gross per 24 hour   Intake 1076.14 ml   Output 3480 ml   Net -2403.86 ml     I/O last 3 completed shifts: In: 2626.5 [P.O.:1200;  I.V.:1321.4; IV Piggyback:105.1]  Out: 4700 [Urine:4700]    Safety Concerns:     [address] Safety Concerns:101165245}    Impairments/Disabilities:      { BRIANA Impairments/Disabilities:212362795}    Nutrition Therapy:  Current Nutrition Therapy:   [facility] BRIANA Diet List:319131775}    Routes of Feeding: {CHP DME Other Feedings:004506780}  Liquids: {Slp liquid thickness:81256}  Daily Fluid Restriction: {CHP DME Yes amt example:854629336}  Last Modified Barium Swallow with Video (Video Swallowing Test): {Done Not Done WPWV:162935325}    Treatments at the Time of Hospital Discharge:   Respiratory Treatments: ***  Oxygen Therapy:  {Therapy; copd oxygen:22366}  Ventilator:    {MH CC Vent CHMW:854081557}    Rehab Therapies: {THERAPEUTIC INTERVENTION:9836155255}  Weight Bearing Status/Restrictions: 50Benny MEDINA Weight Bearin}  Other Medical Equipment (for information only, NOT a DME order):  {EQUIPMENT:252878254}  Other Treatments: ***    Patient's personal belongings (please select all that are sent with patient):  {P DME Belongings:735520991}    RN SIGNATURE:  {Esignature:129707791}    CASE MANAGEMENT/SOCIAL WORK SECTION    Inpatient Status Date: ***    Readmission Risk Assessment Score:  Readmission Risk              Risk of Unplanned Readmission:  9           Discharging to Facility/ Agency   Name:   Address:  Phone:  Fax:    Dialysis Facility (if applicable)   Name:  Address:  Dialysis Schedule:  Phone:  Fax:    / signature: {Esignature:920341246}    PHYSICIAN SECTION    Prognosis: {Prognosis:9997133147}    Condition at Discharge: 50Benny Hayden Patient Condition:148446035}    Rehab Potential (if transferring to Rehab): {Prognosis:2449987242}    Recommended Labs or Other Treatments After Discharge: ***    Physician Certification: I certify the above information and transfer of Brad Rubio  is necessary for the continuing treatment of the diagnosis listed and that he requires {Admit to Appropriate Level of Care:25370} for {GREATER/LESS:315617373} 30 days.      Update Admission H&P: {CHP DME Changes in DFBJN:598485160}    PHYSICIAN SIGNATURE:  Electronically signed by IZABELA Chisholm CNP on 3/9/23 at 11:27 AM EST

## 2023-03-09 NOTE — PROGRESS NOTES
Today's Date: 3/9/2023  Patient Name: Alex Jackson  Date of admission: 3/7/2023  9:36 AM  Patient's age: 57 y.o., 1960  Admission Dx: Open ankle fracture, left, type I or II, with nonunion, subsequent encounter [S82.892M]  Type I or II open fracture of left ankle, initial encounter [S82.892B]      Chief Complaint: Traumatic injury to left foot with fall    Subjective:  Patient seen and examined at bedside  No active complaints today  Clinically stable  MRI pending  Myeloma work-up negative so far    Brief history:     The patient is a 57 y.o. male who is admitted to the hospital for traumatic injury to fall leading to open fracture of left ankle requiring ORIF. 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 During trauma work-up, CT lumbar spine showed 1 cm intraosseous lucency in the posterior aspect of the right ilium and smaller similar lucency seen in the lower posterior aspect of the left ilium. Heme-onc has been consulted to evaluate for these lucencies. On my evaluation, patient is alert oriented x3. He is resting comfortably in the bed. Not in acute distress. Requiring intermittent pacing with the pacemaker. Has intact dressing on the left lower extremity after surgery for ankle fracture. He does have some PATRICIA with creatinine 1.33. CBC reviewed and cell counts are appropriate. Past Medical History:   has no past medical history on file. Past Surgical History:   has a past surgical history that includes Ankle fracture surgery (Left, 3/7/2023). Medications:    Prior to Admission medications    Medication Sig Start Date End Date Taking?  Authorizing Provider   vitamin D (ERGOCALCIFEROL) 1.25 MG (61338 UT) CAPS capsule Take 1 capsule by mouth once a week for 8 doses 3/7/23 4/26/23 Yes Aimee Cisneros, DO     Current Facility-Administered Medications   Medication Dose Route Frequency Provider Last Rate Last Admin    heparin (porcine) injection 5,000 Units  5,000 Units SubCUTAneous 3 times per day Juan Patel MD   5,000 Units at 03/09/23 1413    acetaminophen (TYLENOL) tablet 1,000 mg  1,000 mg Oral 3 times per day TammyIZABELA Smis - CNP   1,000 mg at 03/09/23 1413    senna (SENOKOT) tablet 8.6 mg  1 tablet Oral Daily TammyIZABELA Sims - CNP   8.6 mg at 03/09/23 1124    lactated ringers IV soln infusion   IntraVENous Continuous TammyIZABELA Sims - CNP 60 mL/hr at 03/09/23 1529 Rate Verify at 03/09/23 1529    sodium chloride flush 0.9 % injection 5-40 mL  5-40 mL IntraVENous 2 times per day Claudean Hilding, MD   10 mL at 03/08/23 2109    sodium chloride flush 0.9 % injection 5-40 mL  5-40 mL IntraVENous PRN Claudean Hilding, MD        0.9 % sodium chloride infusion   IntraVENous PRN Claudean Hilding, MD        ondansetron (ZOFRAN-ODT) disintegrating tablet 4 mg  4 mg Oral Q8H PRN Claudean Hilding, MD        Or    ondansetron [facility]) injection 4 mg  4 mg IntraVENous Q6H PRN Claudean Hilding, MD   4 mg at 03/08/23 0015    polyethylene glycol (GLYCOLAX) packet 17 g  17 g Oral Daily Claudean Hilding, MD   17 g at 03/08/23 0746    gabapentin (NEURONTIN) capsule 100 mg  100 mg Oral Q8H Claudean Hilding, MD   100 mg at 03/09/23 1124    oxyCODONE (ROXICODONE) immediate release tablet 5 mg  5 mg Oral Q6H PRN Chivo Driver MD   5 mg at 03/09/23 0141       Allergies:  Patient has no known allergies. Social History:        Family History: Family history is unknown by patient. Review of Symptoms:    Constitutional: No fever or chills.  No night sweats, no weight loss   Eyes: No eye discharge, double vision, or eye pain   HEENT: negative for sore mouth, sore throat, hoarseness and voice change   Respiratory: negative for cough , sputum, dyspnea, wheezing, hemoptysis, chest pain   Cardiovascular: negative for chest pain, dyspnea, palpitations, orthopnea, PND   Gastrointestinal: negative for nausea, vomiting, diarrhea, constipation, abdominal pain, Dysphagia, hematemesis and hematochezia   Genitourinary: negative for frequency, dysuria, nocturia, urinary incontinence, and hematuria   Integument: negative for rash, skin lesions, bruises.    Hematologic/Lymphatic: negative for easy bruising, bleeding, lymphadenopathy, or petechiae   Endocrine: negative for heat or cold intolerance,weight changes, change in bowel habits and hair loss   Musculoskeletal: Left lower extremity pain after ankle fracture surgery  Neurological: negative for headaches, dizziness, seizures, weakness, numbness    PHYSICAL EXAM:      BP (!) 160/78   Pulse 86   Temp 99.2 °F (37.3 °C) (Oral)   Resp 24   Ht 5' 9\" (1.753 m)   Wt 200 lb 4.8 oz (90.9 kg)   SpO2 95%   BMI 29.58 kg/m²    Temp (24hrs), Av.4 °F (37.4 °C), Min:99 °F (37.2 °C), Max:99.8 °F (37.7 °C)      General appearance - well appearing, no in pain or distress   Mental status - alert and cooperative   Eyes - pupils equal and reactive, extraocular eye movements intact   Ears - bilateral TM's and external ear canals normal   Mouth - mucous membranes moist, pharynx normal without lesions   Neck - supple, no significant adenopathy   Lymphatics - no palpable lymphadenopathy, no hepatosplenomegaly   Chest - clear to auscultation, no wheezes, rales or rhonchi, symmetric air entry   Heart - normal rate, regular rhythm, normal S1, S2, no murmurs  Abdomen - soft, nontender, nondistended, no masses or organomegaly   Neurological - alert, oriented, normal speech, no focal findings or movement disorder noted   Musculoskeletal -intact dressing of the left lower extremity after ankle fracture surgery  Extremities - peripheral pulses normal, no pedal edema, no clubbing or cyanosis   Skin - normal coloration and turgor, no rashes, no suspicious skin lesions noted ,    Labs:   Complete Blood Count:   Recent Labs     23  0959 23  0511 23  0301   WBC 5.5 9.2 9.1   HGB 14.9 14.8 13.1 MCV 84.1 85.2 82.5*    279 198   RBC 5.40 5.42 4.75   HCT 45.4 46.2 39.2*   MCH 27.6 27.3 27.6   MCHC 32.8 32.0 33.4   RDW 13.0 13.2 13.4   MPV 11.1 10.5 10.1        PT/INR:    Lab Results   Component Value Date/Time    PROTIME 11.1 03/08/2023 05:11 AM    INR 1.0 03/08/2023 05:11 AM     PTT:    Lab Results   Component Value Date/Time    APTT 24.3 03/08/2023 05:11 AM       Basal Metabolic Profile:   Recent Labs     03/07/23  0959 03/08/23  0511 03/09/23  0301    137 140   K 3.3* 4.4 4.1   BUN 11 13 19   CREATININE 0.99 1.33* 1.36*   * 103 106   CO2 22 20 26        LFTs  Recent Labs     03/09/23  0301   ALKPHOS 92   ALT 12   AST 26   BILITOT 0.3   LABALBU 3.6       Imaging:  XR TIBIA FIBULA LEFT (2 VIEWS)    Result Date: 3/7/2023  1. Overall near anatomic alignment of the left ankle following ORIF. 2. 6 mm metallic foreign body in the soft tissues medial to the left tibial plateau. XR ANKLE LEFT (2 VIEWS)    Result Date: 3/7/2023  Minimally displaced medial malleolar fracture. Minimally displaced comminuted fracture of the distal fibular metadiaphysis. Minimal lateral displacement of the ankle mortise. Soft tissue swelling. XR ANKLE LEFT (2 VIEWS)    Result Date: 3/7/2023  Nondisplaced medial malleolar fracture. Minimally displaced comminuted distal fibular metadiaphysis fracture. Soft tissue swelling. XR ANKLE LEFT (2 VIEWS)    Result Date: 3/7/2023  Nondisplaced medial malleolar fracture. Minimally displaced comminuted fracture of the distal fibular metadiaphysis. Soft tissue swelling. XR ANKLE LEFT (MIN 3 VIEWS)    Result Date: 3/7/2023  1. Overall near anatomic alignment of the left ankle following ORIF. 2. 6 mm metallic foreign body in the soft tissues medial to the left tibial plateau. XR ANKLE LEFT (MIN 3 VIEWS)    Result Date: 3/7/2023  Severe dislocation of the ankle mortise laterally with overriding.  Comminuted fracture of the distal fibular metadiaphysis with overriding and angulation. Soft tissue swelling and apparent skin breakthrough medially. CT HEAD WO CONTRAST    Result Date: 3/7/2023  No acute intracranial abnormality. CT CERVICAL SPINE WO CONTRAST    Result Date: 3/7/2023  Multilevel cervical spondylosis and degenerative disc disease. Mild spinal stenosis at C5-6 and C6-7 Evidence of paracervical spasm No acute bony abnormalities are noted in the cervical and thoracic spine 1 cm intraosseous lucency in the posterior aspect of the right ilium. Smaller similar lucency seen in the lower posterior aspect of the left ilium. Small lytic lesions cannot be excluded. L4-5 and L5-S1 degenerative disc disease. There are erosive changes of the vertebral endplates and an underlying infectious/inflammatory process cannot be entirely excluded. RECOMMENDATIONS: Further evaluation of the lumbar spine should be obtained with MR imaging if clinically indicated. CT CHEST ABDOMEN PELVIS WO CONTRAST Additional Contrast? None    Result Date: 3/7/2023  1. No acute intrathoracic abnormality. 2. No acute intra-abdominal abnormality within the limitations of a noncontrast study. 3. Prostatomegaly. 4. Urinary bladder wall thickening, which may be due to chronic outlet obstruction. 5. Bilateral nonobstructing nephrolithiasis. CT LUMBAR SPINE TRAUMA RECONSTRUCTION    Result Date: 3/7/2023  Multilevel cervical spondylosis and degenerative disc disease. Mild spinal stenosis at C5-6 and C6-7 Evidence of paracervical spasm No acute bony abnormalities are noted in the cervical and thoracic spine 1 cm intraosseous lucency in the posterior aspect of the right ilium. Smaller similar lucency seen in the lower posterior aspect of the left ilium. Small lytic lesions cannot be excluded. L4-5 and L5-S1 degenerative disc disease. There are erosive changes of the vertebral endplates and an underlying infectious/inflammatory process cannot be entirely excluded.  RECOMMENDATIONS: Further evaluation of the lumbar spine should be obtained with MR imaging if clinically indicated. CT THORACIC SPINE TRAUMA RECONSTRUCTION    Result Date: 3/7/2023  Multilevel cervical spondylosis and degenerative disc disease. Mild spinal stenosis at C5-6 and C6-7 Evidence of paracervical spasm No acute bony abnormalities are noted in the cervical and thoracic spine 1 cm intraosseous lucency in the posterior aspect of the right ilium. Smaller similar lucency seen in the lower posterior aspect of the left ilium. Small lytic lesions cannot be excluded. L4-5 and L5-S1 degenerative disc disease. There are erosive changes of the vertebral endplates and an underlying infectious/inflammatory process cannot be entirely excluded. RECOMMENDATIONS: Further evaluation of the lumbar spine should be obtained with MR imaging if clinically indicated. Impression:   Primary Problem  Open ankle fracture, left, type I or II, with nonunion, subsequent encounter  Active Hospital Problems    Diagnosis Date Noted    Open fracture of left ankle [S82.892B] 03/07/2023     Priority: Medium    Bradycardia [R00.1] 03/07/2023     Priority: Medium    Open ankle fracture, left, type I or II, with nonunion, subsequent encounter [S82.892M] 03/07/2023     Priority: Medium     Incidental finding of lucent lesions in the right and left ilium  Traumatic injury with fall leading to open fracture of left ankle status post reduction surgery  Due to 1 AV block status post placement of temporary pacemaker    Assessment and Plan:    Obtain MRI for better evaluation of interosseous lucent lesions of ileum seen on CT scan. If cannot obtain MRI due to pacemaker, then will obtain outpatient PET/CT  Myeloma work-up negative so far  Rest of the management per primary    Discussed with patient and Nurse. Thank you for asking us to see this patient.     Nena Miranda MD  Internal Medicine Resident, PGY-3  [address] 21 3/9/2023, 3:39 PM    I have seen and examined the patient independently. I reviewed all laboratory and imaging studies that are relevant. I agree with the resident note with the addendum.     Electronically signed by Merrill Jimenez MD on 3/9/2023 at 11:14 PM

## 2023-03-10 VITALS
OXYGEN SATURATION: 94 % | DIASTOLIC BLOOD PRESSURE: 89 MMHG | RESPIRATION RATE: 19 BRPM | WEIGHT: 200.3 LBS | BODY MASS INDEX: 29.67 KG/M2 | TEMPERATURE: 98.8 F | HEIGHT: 69 IN | SYSTOLIC BLOOD PRESSURE: 154 MMHG | HEART RATE: 91 BPM

## 2023-03-10 LAB
ABSOLUTE EOS #: 0.04 K/UL (ref 0–0.44)
ABSOLUTE IMMATURE GRANULOCYTE: 0.06 K/UL (ref 0–0.3)
ABSOLUTE LYMPH #: 1.49 K/UL (ref 1.1–3.7)
ABSOLUTE MONO #: 0.74 K/UL (ref 0.1–1.2)
ALBUMIN (CALCULATED): 3.8 G/DL (ref 3.2–5.2)
ALBUMIN PERCENT: 64 % (ref 45–65)
ALPHA 1 PERCENT: 3 % (ref 3–6)
ALPHA 2 PERCENT: 8 % (ref 6–13)
ALPHA1 GLOB SERPL ELPH-MCNC: 0.2 G/DL (ref 0.1–0.4)
ALPHA2 GLOB SERPL ELPH-MCNC: 0.5 G/DL (ref 0.5–0.9)
ANION GAP SERPL CALCULATED.3IONS-SCNC: 9 MMOL/L (ref 9–17)
B-GLOBULIN SERPL ELPH-MCNC: 0.6 G/DL (ref 0.5–1.1)
BASOPHILS # BLD: 1 % (ref 0–2)
BASOPHILS ABSOLUTE: 0.07 K/UL (ref 0–0.2)
BETA PERCENT: 10 % (ref 11–19)
BUN SERPL-MCNC: 12 MG/DL (ref 8–23)
CALCIUM SERPL-MCNC: 9 MG/DL (ref 8.6–10.4)
CHLORIDE SERPL-SCNC: 105 MMOL/L (ref 98–107)
CO2 SERPL-SCNC: 23 MMOL/L (ref 20–31)
CREAT SERPL-MCNC: 1.03 MG/DL (ref 0.7–1.2)
EOSINOPHILS RELATIVE PERCENT: 1 % (ref 1–4)
GAMMA GLOB SERPL ELPH-MCNC: 0.8 G/DL (ref 0.5–1.5)
GAMMA GLOBULIN %: 14 % (ref 9–20)
GFR SERPL CREATININE-BSD FRML MDRD: >60 ML/MIN/1.73M2
GLUCOSE SERPL-MCNC: 126 MG/DL (ref 70–99)
HCT VFR BLD AUTO: 43.6 % (ref 40.7–50.3)
HGB BLD-MCNC: 13.5 G/DL (ref 13–17)
IMMATURE GRANULOCYTES: 1 %
LYMPHOCYTES # BLD: 18 % (ref 24–43)
MCH RBC QN AUTO: 27.2 PG (ref 25.2–33.5)
MCHC RBC AUTO-ENTMCNC: 31 G/DL (ref 28.4–34.8)
MCV RBC AUTO: 87.7 FL (ref 82.6–102.9)
MONOCYTES # BLD: 9 % (ref 3–12)
NRBC AUTOMATED: 0.2 PER 100 WBC
PATHOLOGIST: ABNORMAL
PATHOLOGIST: NORMAL
PDW BLD-RTO: 13.3 % (ref 11.8–14.4)
PLATELET # BLD AUTO: 213 K/UL (ref 138–453)
PMV BLD AUTO: 11.4 FL (ref 8.1–13.5)
POTASSIUM SERPL-SCNC: 4.5 MMOL/L (ref 3.7–5.3)
PROT SERPL-MCNC: 5.9 G/DL (ref 6.4–8.3)
PROTEIN ELECTROPHORESIS, SERUM: ABNORMAL
RBC # BLD: 4.97 M/UL (ref 4.21–5.77)
SEG NEUTROPHILS: 71 % (ref 36–65)
SEGMENTED NEUTROPHILS ABSOLUTE COUNT: 5.87 K/UL (ref 1.5–8.1)
SERUM IFX INTERP: NORMAL
SODIUM SERPL-SCNC: 137 MMOL/L (ref 135–144)
TOTAL PROT. SUM,%: 99 % (ref 98–102)
TOTAL PROT. SUM: 5.9 G/DL (ref 6.3–8.2)
WBC # BLD AUTO: 8.3 K/UL (ref 3.5–11.3)

## 2023-03-10 PROCEDURE — 97530 THERAPEUTIC ACTIVITIES: CPT

## 2023-03-10 PROCEDURE — 93242 EXT ECG>48HR<7D RECORDING: CPT

## 2023-03-10 PROCEDURE — 97116 GAIT TRAINING THERAPY: CPT

## 2023-03-10 PROCEDURE — 94761 N-INVAS EAR/PLS OXIMETRY MLT: CPT

## 2023-03-10 PROCEDURE — 99232 SBSQ HOSP IP/OBS MODERATE 35: CPT | Performed by: INTERNAL MEDICINE

## 2023-03-10 PROCEDURE — 80048 BASIC METABOLIC PNL TOTAL CA: CPT

## 2023-03-10 PROCEDURE — 6370000000 HC RX 637 (ALT 250 FOR IP): Performed by: STUDENT IN AN ORGANIZED HEALTH CARE EDUCATION/TRAINING PROGRAM

## 2023-03-10 PROCEDURE — 85025 COMPLETE CBC W/AUTO DIFF WBC: CPT

## 2023-03-10 PROCEDURE — 36415 COLL VENOUS BLD VENIPUNCTURE: CPT

## 2023-03-10 PROCEDURE — 6360000002 HC RX W HCPCS

## 2023-03-10 PROCEDURE — 97162 PT EVAL MOD COMPLEX 30 MIN: CPT

## 2023-03-10 PROCEDURE — 6370000000 HC RX 637 (ALT 250 FOR IP): Performed by: NURSE PRACTITIONER

## 2023-03-10 PROCEDURE — 93243 EXT ECG>48HR<7D SCAN A/R: CPT

## 2023-03-10 PROCEDURE — 97535 SELF CARE MNGMENT TRAINING: CPT

## 2023-03-10 PROCEDURE — 97165 OT EVAL LOW COMPLEX 30 MIN: CPT

## 2023-03-10 RX ORDER — OXYCODONE HYDROCHLORIDE 5 MG/1
5 TABLET ORAL EVERY 8 HOURS PRN
Qty: 12 TABLET | Refills: 0 | Status: SHIPPED | OUTPATIENT
Start: 2023-03-10 | End: 2023-03-15

## 2023-03-10 RX ORDER — GABAPENTIN 100 MG/1
100 CAPSULE ORAL EVERY 8 HOURS
Qty: 30 CAPSULE | Refills: 0 | Status: SHIPPED | OUTPATIENT
Start: 2023-03-10 | End: 2023-03-20

## 2023-03-10 RX ADMIN — HEPARIN SODIUM 5000 UNITS: 5000 INJECTION INTRAVENOUS; SUBCUTANEOUS at 09:12

## 2023-03-10 RX ADMIN — SENNOSIDES 8.6 MG: 8.6 TABLET, COATED ORAL at 09:12

## 2023-03-10 RX ADMIN — ACETAMINOPHEN 1000 MG: 500 TABLET ORAL at 09:12

## 2023-03-10 RX ADMIN — ACETAMINOPHEN 1000 MG: 500 TABLET ORAL at 14:44

## 2023-03-10 RX ADMIN — GABAPENTIN 100 MG: 100 CAPSULE ORAL at 14:44

## 2023-03-10 RX ADMIN — OXYCODONE HYDROCHLORIDE 5 MG: 5 TABLET ORAL at 04:15

## 2023-03-10 RX ADMIN — POLYETHYLENE GLYCOL 3350 17 G: 17 POWDER, FOR SOLUTION ORAL at 09:12

## 2023-03-10 RX ADMIN — GABAPENTIN 100 MG: 100 CAPSULE ORAL at 04:15

## 2023-03-10 ASSESSMENT — PAIN DESCRIPTION - LOCATION: LOCATION: ANKLE

## 2023-03-10 ASSESSMENT — PAIN SCALES - GENERAL
PAINLEVEL_OUTOF10: 7
PAINLEVEL_OUTOF10: 7

## 2023-03-10 NOTE — PROGRESS NOTES
Physical Therapy  Facility/Department: [facility] CAR 1- SICU  Physical Therapy Initial Assessment    Name: Brad Rubio  : 1960  MRN: [id number]  Date of Service: 3/10/2023  Pt states he was cleaning a large part at work when it fell on his ankle, causing a fracture; pt had 1. LEFT ANKLE OPEN REDUCTION INTERNAL FIXATION OF BIMALLEOLAR ANKLE FRACTURE  2. OPEN REDUCTION INTERNAL FIXATION OF LEFT SYNDESMOSIS  3. IRRIGATION AND EXCISIONAL DEBRIDEMENT OF SKIN DOWN TO AND INCLUDING THE BONE OF OPEN LEFT ANKLE FRACTURE  4. STRESS OF LEFT ANKLE UNDER ANESTHESIA WITH INDEPENDENT IMAGING INTERPRETATION  5. CLOSURE OF COMPLEX WOUND MEASURING 6 CM 3/7/23  Pt had temporary pacemaker removed 3/9/23 late afternoon. Discharge Recommendations:  No further therapy required at discharge. Pt would benefit from PT once splint off and able to work on strength/ROM L ankle      PT Equipment Recommendations  Equipment Needed: Yes  Mobility Devices: Walker;Crutches; Wheelchair  Walker: Rolling  Crutches: Axillary  Wheelchair: Wheelchair Cushion Standard (elevating leg rests, removable arm rests)      Patient Diagnosis(es): The encounter diagnosis was Type I or II open fracture of left ankle, initial encounter. Past Medical History:  has no past medical history on file. Past Surgical History:  has a past surgical history that includes Ankle fracture surgery (Left, 3/7/2023). Assessment   Pt cooperative, motivated, does well maintaining NWB LLE. He is safest ambulating with rwalker on flat surfaces, but needs crutches to go up/down steps of which he has 3 to get into his mobile home. Pt practiced the steps multiple times, I still recommend him to have someone with him for safety when he goes up/down steps. Pt states that he can have someone stay with him the \"first day or two\" just for safety. Body Structures, Functions, Activity Limitations Requiring Skilled Therapeutic Intervention: Decreased functional mobility ; Decreased ROM;Decreased balance  Therapy Prognosis: Good  Decision Making: Medium Complexity  Requires PT Follow-Up: Yes  Activity Tolerance  Activity Tolerance: Patient tolerated treatment well     Plan   Physcial Therapy Plan  General Plan:  (5-6 visits weekly)  Current Treatment Recommendations: Strengthening, ROM, Balance training, Functional mobility training, Transfer training, Endurance training, Gait training, Stair training, Safety education & training, Patient/Caregiver education & training, Equipment evaluation, education, & procurement, Positioning, Therapeutic activities  Safety Devices  Type of Devices: Call light within reach, Gait belt, Patient at risk for falls, Left in chair, Nurse notified  Restraints  Restraints Initially in Place: No     Restrictions  Restrictions/Precautions  Restrictions/Precautions: Weight Bearing, Cardiac, Surgical Protocols, Fall Risk, General Precautions, Up as Tolerated  Required Braces or Orthoses?: No  Lower Extremity Weight Bearing Restrictions  Left Lower Extremity Weight Bearing: Non Weight Bearing     Subjective   General  Patient assessed for rehabilitation services?: Yes  Response To Previous Treatment: Not applicable  Family / Caregiver Present: No  Follows Commands: Within Functional Limits  Subjective  Subjective: 1/10 L ankle         Social/Functional History  Social/Functional History  Lives With: Alone  Type of Home: Trailer  Home Layout: One level  Home Access: Stairs to enter with rails  Entrance Stairs - Number of Steps: 3 steps one rail  Entrance Stairs - Rails: Left  Home Equipment: None  Has the patient had two or more falls in the past year or any fall with injury in the past year?: No  ADL Assistance: [address] [place] [address] Avenue: Independent  Homemaking Responsibilities: Yes  Ambulation Assistance: Independent  Transfer Assistance: Independent  Active : Yes  Mode of Transportation: Car, Truck  Occupation: Full time employment  Type of Occupation: [employer]  Vision/Hearing  Vision  Vision: Within Functional Limits  Hearing  Hearing: Within functional limits    Cognition   Orientation  Overall Orientation Status: Within Normal Limits  Orientation Level: Oriented X4  Cognition  Overall Cognitive Status: WNL     Objective   Heart Rate: 81  Heart Rate Source: Monitor  BP: 122/81  BP Location: Left upper arm  BP Method: Automatic  Patient Position: Semi fowlers  MAP (Calculated): 95  Resp: 14  SpO2: 94 %  O2 Device: None (Room air)     Observation/Palpation  Posture: Good        AROM RLE (degrees)  RLE AROM: WFL  AROM LLE (degrees)  LLE AROM : Exceptions--unable to assess ankle d/t splint s/p ortho surgery; knee/hip WFL  AROM RUE (degrees)  RUE AROM : WFL  AROM LUE (degrees)  LUE AROM : WFL  Strength RLE  Strength RLE: WNL  Strength LLE  Strength LLE: Exception--unable to assess ankle; knee/hip WFL  Strength RUE  Strength RUE: WNL  Strength LUE  Strength LUE: WNL           Bed mobility  Bed Mobility Comments: pt up in chair upon PT arrival and retired to chair at end of PT session  Transfers  Sit to Stand: Supervision  Stand to Sit: Supervision  Stand Pivot Transfers: Supervision  Ambulation  Surface: Level tile  Device: Rolling Walker  Assistance: Modified Independent  Gait Deviations: Slow Inocencia  Distance: 15'x4; 200'x1 seated rest break in between  More Ambulation?: Yes  Ambulation 2  Surface - 2: level tile  Device 2: Axillary Crutches  Assistance 2: Contact guard assistance  Gait Deviations: Slow Inocencia;Staggers  Distance: 15'x1; 50'x1  Comments: pt safer with and prefers the rwalker  Stairs/Curb  Stairs?: Yes  Stairs  # Steps : 3 (steps x 3 reps, 1 seated rest break; he did 12 steps x 1)  Stairs Height: 6\"  Rails: Left ascending  Device: Crutches (1 crutch, 1 HR)  Assistance: Minimal assistance;Supervision (initially min A, progressed to SBA+1; occasionally needed verbal cues for proper sequencing--improved with practice)     Balance  Posture: Good  Sitting - Static: Good  Sitting - Dynamic: Good  Standing - Static: Good  Standing - Dynamic: Fair  A/AROM Exercises: ankle pumps RLE, toe \"pumps\" LLE  AM-PAC Score  AM-PAC Inpatient Mobility Raw Score : 21 (03/10/23 1131)  AM-PAC Inpatient T-Scale Score : 50.25 (03/10/23 1131)  Mobility Inpatient CMS 0-100% Score: 28.97 (03/10/23 1131)  Mobility Inpatient CMS G-Code Modifier : CJ (03/10/23 1131)        Goals  Short Term Goals  Time Frame for Short Term Goals: 6 visits  Short Term Goal 1: independent bed mobility  Short Term Goal 2: independent transfers  Short Term Goal 3: independent gait with rwalker x 200'  Short Term Goal 4: independent stair ambulation with 1 crutch and 1 HR x 3 steps  Patient Goals   Patient Goals : return home independently       Education  Patient Education  Education Given To: Patient  Education Provided: Role of Therapy;Plan of Care;Home Exercise Program;Precautions;Transfer Training;Energy Conservation;Equipment; Fall Prevention Strategies  Education Method: Verbal;Demonstration; Teach Back  Barriers to Learning: None  Education Outcome: Verbalized understanding;Demonstrated understanding;Continued education needed      Therapy Time   Individual Concurrent Group Co-treatment   Time In 0940         Time Out 1426         Minutes 15         Timed Code Treatment Minutes: 61 Minutes       Bc Jacobs PT

## 2023-03-10 NOTE — PROGRESS NOTES
Occupational Therapy  Facility/Department: [facility] CAR 1- SICU  Occupational Therapy Initial Assessment    Name: Hilda Longoria  : 1960  MRN: [id number]  Date of Service: 3/10/2023    Discharge Recommendations:   Further therapy recommended at discharge. Continue to assess  OT Equipment Recommendations  Equipment Needed: Yes  Mobility Devices: ADL Assistive Devices  ADL Assistive Devices: Shower Chair with back       Patient Diagnosis(es): The encounter diagnosis was Type I or II open fracture of left ankle, initial encounter. Past Medical History:  has no past medical history on file. Past Surgical History:  has a past surgical history that includes Ankle fracture surgery (Left, 3/7/2023). Assessment   Performance deficits / Impairments: Decreased functional mobility ; Decreased ADL status; Decreased endurance;Decreased high-level IADLs  Assessment: pt would benefit from increased assistance upon discharge in order to increase safety. pt would benefit from continued acute OT in order to increase independence with ADLs. Prognosis: Good  Decision Making: Low Complexity  REQUIRES OT FOLLOW-UP: Yes  Activity Tolerance  Activity Tolerance: Patient Tolerated treatment well        Plan   Occupational Therapy Plan  Times Per Week: 2-3x/wk     Restrictions  Restrictions/Precautions  Restrictions/Precautions: Weight Bearing  Required Braces or Orthoses?: No  Lower Extremity Weight Bearing Restrictions  Left Lower Extremity Weight Bearing: Non Weight Bearing  Position Activity Restriction  Other position/activity restrictions: ORIF L ankle 3/7, NWB L LE, activity as tolerated    Subjective   General  Patient assessed for rehabilitation services?: Yes  Family / Caregiver Present: No  General Comment  Comments: RN ok'd for therapy this morning. pt agreeable to participate in session and cooperative/pleasant throughout.  pt denied pain     Social/Functional History  Social/Functional History  Lives With: Alone  Type of Home: Trailer  Home Layout: One level  Home Access: Stairs to enter with rails  Entrance Stairs - Number of Steps: 3 steps  Entrance Stairs - Rails: Left  Bathroom Shower/Tub: Tub/Shower unit  Bathroom Toilet: Standard  Home Equipment:  (pt reported no use of DME at baseline)  Has the patient had two or more falls in the past year or any fall with injury in the past year?: No  ADL Assistance: Independent  Homemaking Assistance: Independent  Homemaking Responsibilities: Yes  Meal Prep Responsibility: Primary  Laundry Responsibility: Primary  Cleaning Responsibility: Primary  Ambulation Assistance: Independent  Transfer Assistance: Independent  Active : Yes  Mode of Transportation: Car  Occupation: Full time employment  Type of Occupation: [employer]  Leisure & Hobbies: video games  Additional Comments: pt reported supportive family and friends who are able to assist PRN       Objective               Safety Devices  Type of Devices: Call light within reach;Gait belt;Left in chair;Nurse notified  Restraints  Restraints Initially in Place: No    Bed Mobility Training  Bed Mobility Training: No (pt in chair upon arrival and retired to chair at end of session)  Balance  Sitting: Intact (~15 minutes in chair)  Standing: Intact (~2 minutes. pt took steps forward/backward 2x with CGA and RW. pt able to maintain NWB to L LE throughout)  Transfer Training  Transfer Training: Yes  Overall Level of Assistance: Contact-guard assistance (with RW)  Sit to Stand: Contact-guard assistance  Stand to Sit: Contact-guard assistance  Gait  Overall Level of Assistance: Contact-guard assistance (with RW)       AROM: Within functional limits  Strength:  Within functional limits  Coordination: Within functional limits  Tone: Normal  Sensation: Intact    ADL  Feeding: Independent  Grooming: Independent  UE Bathing: Supervision  LE Bathing: Minimal assistance  UE Dressing: Supervision  LE Dressing: Minimal assistance  Toileting: Contact guard assistance  Additional Comments: OT facilitated pt in donning/doffing R sock and wash face while seated in chair. pt required increased time to don/doff sock but able to complete with SBA and able to wash face without difficulty.              Vision  Vision: Impaired  Vision Exceptions: Wears glasses for reading  Hearing  Hearing: Within functional limits    Cognition  Overall Cognitive Status: WFL  Orientation  Overall Orientation Status: Within Functional Limits  Orientation Level: Oriented X4                    Education Given To: Patient  Education Provided: Role of Therapy;Plan of Care;Precautions  Education Provided Comments: WB status  Education Method: Verbal  Barriers to Learning: None  Education Outcome: Verbalized understanding    LUE AROM (degrees)  LUE AROM : WFL  Left Hand AROM (degrees)  Left Hand AROM: WFL  RUE AROM (degrees)  RUE AROM : WFL  Right Hand AROM (degrees)  Right Hand AROM: WFL        Hand Dominance  Hand Dominance: Right                                                          AM-PAC Score        AM-PAC Inpatient Daily Activity Raw Score: 20 (03/10/23 1247)  AM-PAC Inpatient ADL T-Scale Score : 42.03 (03/10/23 1247)  ADL Inpatient CMS 0-100% Score: 38.32 (03/10/23 1247)  ADL Inpatient CMS G-Code Modifier : CJ (03/10/23 1247)           Goals  Short Term Goals  Time Frame for Short Term Goals: pt will, by discharge  Short Term Goal 1: complete LB ADLs and toileting tasks with SBA and set up  Short Term Goal 2: complete UB ADLs with mod I  Short Term Goal 3: ind maintain NWB to L LE during functional transfers/funcitonal mobility  Short Term Goal 4: dem supervision during functional transfers/functional mobility with LRd, as needed  Short Term Goal 5: dem ~6 minutes dynamic standing tolerance with supervision in order to complete functional tasks       Therapy Time   Individual Concurrent Group Co-treatment   Time In [address]         Time Out 9477 Minutes 17         Timed Code Treatment Minutes: 1125 FABIAN PerryR/L

## 2023-03-10 NOTE — CARE COORDINATION
C9 & clinicals faxed to [facility] @ [phone]/ Claim # [id number]. OT note will need to be sent once completed.

## 2023-03-10 NOTE — FLOWSHEET NOTE
Pt identified by name/. Attached device to pt and device is operational. Family member present and both have no questions , verbalized understanding. Pt to return unti in 48 hrs.

## 2023-03-10 NOTE — PROGRESS NOTES
Macy Solares was evaluated today and a DME order was entered for a wheeled walker because he requires this to successfully complete daily living tasks of personal cares and ambulating. A wheeled walker is necessary due to the patient's unsteady gait, upper body weakness, and inability to  an ambulation device; and he can ambulate only by pushing a walker instead of a lesser assistive device such as a cane, crutch, or standard walker. The need for this equipment was discussed with the patient and he understands and is in agreement. Irineo Toribio was evaluated today and a DME order was entered for a shower/bath seat without back because the patient requires this to successfully complete daily living tasks of bathing, grooming and hygiene. A shower/bath seat without back is necessary due to the patient's unsteady gait, inability to stand unassisted in the shower/bath. The need for this equipment was discussed with the patient. They understand and are in agreement. Macy Solares was evaluated today and a DME order was entered for a standard wheelchair because he requires this to successfully complete daily living tasks of personal cares and ambulating. A standard manual wheelchair is necessary due to patient's impaired ambulation and mobility restrictions and would be unable to resolve these daily living tasks using a cane or walker. The patient is capable of using a standard wheelchair safely in their home and can maneuver within their home with adequate access. There is a caregiver available to provide necessary assistance. The need for this equipment was discussed with the patient and he understands, is in agreement, and has not expressed an unwillingness to use the wheelchair. Irineo Toribio requires crutches due to impaired ambulation and for increased stability in order to participate in or complete daily living tasks of: personal cares and ambulating.   The patient is able to safely use the crutches and the functional mobility deficit can be sufficiently resolved.

## 2023-03-10 NOTE — PLAN OF CARE
Problem: Discharge Planning  Goal: Discharge to home or other facility with appropriate resources  3/9/2023 2258 by Brisa Padilla RN  Outcome: Progressing  3/9/2023 0941 by Mark Mensah RN  Outcome: Progressing     Problem: Pain  Goal: Verbalizes/displays adequate comfort level or baseline comfort level  3/9/2023 2258 by Brisa Pdailla RN  Outcome: Progressing  3/9/2023 0941 by Mark Mensah RN  Outcome: Progressing     Problem: ABCDS Injury Assessment  Goal: Absence of physical injury  Outcome: Progressing     Problem: Safety - Adult  Goal: Free from fall injury  3/9/2023 2258 by Brisa Padilla RN  Outcome: Progressing  3/9/2023 0941 by Mark Mensah RN  Outcome: Progressing     Problem: Skin/Tissue Integrity  Goal: Absence of new skin breakdown  Description: 1. Monitor for areas of redness and/or skin breakdown  2. Assess vascular access sites hourly  3. Every 4-6 hours minimum:  Change oxygen saturation probe site  4. Every 4-6 hours:  If on nasal continuous positive airway pressure, respiratory therapy assess nares and determine need for appliance change or resting period.   Outcome: Progressing

## 2023-03-10 NOTE — PROGRESS NOTES
CLINICAL PHARMACY NOTE: MEDS TO BEDS    Total # of Prescriptions Filled: 3   The following medications were delivered to the patient:  Vitamin D [id number]  Gabapentin 100mg  Oxycodone 5mg    Additional Documentation: delivered to patient in room 1021 3/10 at 3pm. Co-pay $31.96 clover.

## 2023-03-10 NOTE — PLAN OF CARE
Problem: Discharge Planning  Goal: Discharge to home or other facility with appropriate resources  3/10/2023 1029 by Klely Blanco RN  Outcome: Progressing  Flowsheets (Taken 3/10/2023 0800 by Shanique Gant RN)  Discharge to home or other facility with appropriate resources: Identify barriers to discharge with patient and caregiver  3/9/2023 2258 by Mundo Augustin RN  Outcome: Progressing     Problem: Pain  Goal: Verbalizes/displays adequate comfort level or baseline comfort level  3/10/2023 1029 by Kelly Blanco RN  Outcome: Progressing  3/9/2023 2258 by Mundo Augustin RN  Outcome: Progressing     Problem: ABCDS Injury Assessment  Goal: Absence of physical injury  3/10/2023 1029 by Kelly Blanco RN  Outcome: Progressing  3/9/2023 2258 by Mundo Augustin RN  Outcome: Progressing     Problem: Safety - Adult  Goal: Free from fall injury  3/10/2023 1029 by Kelly Blanco RN  Outcome: Progressing  3/9/2023 2258 by Mundo Augustin RN  Outcome: Progressing     Problem: Skin/Tissue Integrity  Goal: Absence of new skin breakdown  Description: 1. Monitor for areas of redness and/or skin breakdown  2. Assess vascular access sites hourly  3. Every 4-6 hours minimum:  Change oxygen saturation probe site  4. Every 4-6 hours:  If on nasal continuous positive airway pressure, respiratory therapy assess nares and determine need for appliance change or resting period.   3/10/2023 1029 by Kelly Blanco RN  Outcome: Progressing  3/9/2023 2258 by Mundo Augustin RN  Outcome: Progressing

## 2023-03-10 NOTE — PROGRESS NOTES
I have seen and examined the patient independently. I reviewed all laboratory and imaging studies that are relevant. I agree with the resident note with the addendum. Electronically signed by Norman Urena MD on 3/10/2023 at 11:12 PM     Today's Date: 3/10/2023  Patient Name: Justin Gage  Date of admission: 3/7/2023  9:36 AM  Patient's age: 57 y.o., 1960  Admission Dx: Open ankle fracture, left, type I or II, with nonunion, subsequent encounter [S82.892M]  Type I or II open fracture of left ankle, initial encounter [S82.892B]      Chief Complaint: Traumatic injury to left foot with fall    Subjective:  Patient seen and examined at bedside  No active complaints today  Clinically stable  Multiple myeloma work-up negative  MRI pending    Brief history:     The patient is a 57 y.o. male who is admitted to the hospital for traumatic injury to fall leading to open fracture of left ankle requiring ORIF. 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 During trauma work-up, CT lumbar spine showed 1 cm intraosseous lucency in the posterior aspect of the right ilium and smaller similar lucency seen in the lower posterior aspect of the left ilium. Heme-onc has been consulted to evaluate for these lucencies. On my evaluation, patient is alert oriented x3. He is resting comfortably in the bed. Not in acute distress. Requiring intermittent pacing with the pacemaker. Has intact dressing on the left lower extremity after surgery for ankle fracture. He does have some PATRICIA with creatinine 1.33. CBC reviewed and cell counts are appropriate. Past Medical History:   has no past medical history on file. Past Surgical History:   has a past surgical history that includes Ankle fracture surgery (Left, 3/7/2023). Medications:    Prior to Admission medications    Medication Sig Start Date End Date Taking?  Authorizing Provider   gabapentin (NEURONTIN) 100 MG capsule Take 1 capsule by mouth in the morning and 1 capsule at noon and 1 capsule in the evening. Do all this for 10 days. 3/10/23 3/20/23 Yes Juan Noyola MD   oxyCODONE (ROXICODONE) 5 MG immediate release tablet Take 1 tablet by mouth every 8 hours as needed for Pain for up to 5 days.  Max Daily Amount: 15 mg 3/10/23 3/15/23 Yes Tj Mathis MD   vitamin D (ERGOCALCIFEROL) 1.25 MG (05238 UT) CAPS capsule Take 1 capsule by mouth once a week for 8 doses 3/7/23 4/26/23 Yes Sadie Hudson, DO     Current Facility-Administered Medications   Medication Dose Route Frequency Provider Last Rate Last Admin    heparin (porcine) injection 5,000 Units  5,000 Units SubCUTAneous 3 times per day Juan Noyola MD   5,000 Units at 03/10/23 0912    acetaminophen (TYLENOL) tablet 1,000 mg  1,000 mg Oral 3 times per day IZABELA Hernandez - CNP   1,000 mg at 03/10/23 1444    senna (SENOKOT) tablet 8.6 mg  1 tablet Oral Daily IZABELA Hernandez - CNP   8.6 mg at 03/10/23 0912    lactated ringers IV soln infusion   IntraVENous Continuous IZABELA Hernandez CNP 60 mL/hr at 03/09/23 1529 Rate Verify at 03/09/23 1529    sodium chloride flush 0.9 % injection 5-40 mL  5-40 mL IntraVENous 2 times per day Eldon White MD   10 mL at 03/09/23 2057    sodium chloride flush 0.9 % injection 5-40 mL  5-40 mL IntraVENous PRN Eldon White MD        0.9 % sodium chloride infusion   IntraVENous PRN Eldon White MD        ondansetron (ZOFRAN-ODT) disintegrating tablet 4 mg  4 mg Oral Q8H PRN Eldon White MD        Or    ondansetron [facility]) injection 4 mg  4 mg IntraVENous Q6H PRN Eldon White MD   4 mg at 03/08/23 0015    polyethylene glycol (GLYCOLAX) packet 17 g  17 g Oral Daily Eldon White MD   17 g at 03/10/23 0912    gabapentin (NEURONTIN) capsule 100 mg  100 mg Oral Q8H Eldon White MD   100 mg at 03/10/23 1444    oxyCODONE (ROXICODONE) immediate release tablet 5 mg  5 mg Oral Q6H PRN Sienna Justice MD   5 mg at 03/10/23 0415       Allergies:  Patient has no known allergies. Social History:        Family History: Family history is unknown by patient. Review of Symptoms:    Constitutional: No fever or chills. No night sweats, no weight loss   Eyes: No eye discharge, double vision, or eye pain   HEENT: negative for sore mouth, sore throat, hoarseness and voice change   Respiratory: negative for cough , sputum, dyspnea, wheezing, hemoptysis, chest pain   Cardiovascular: negative for chest pain, dyspnea, palpitations, orthopnea, PND   Gastrointestinal: negative for nausea, vomiting, diarrhea, constipation, abdominal pain, Dysphagia, hematemesis and hematochezia   Genitourinary: negative for frequency, dysuria, nocturia, urinary incontinence, and hematuria   Integument: negative for rash, skin lesions, bruises.    Hematologic/Lymphatic: negative for easy bruising, bleeding, lymphadenopathy, or petechiae   Endocrine: negative for heat or cold intolerance,weight changes, change in bowel habits and hair loss   Musculoskeletal: Left lower extremity pain after ankle fracture surgery  Neurological: negative for headaches, dizziness, seizures, weakness, numbness    PHYSICAL EXAM:      BP (!) 154/89   Pulse 91   Temp 98.8 °F (37.1 °C) (Oral)   Resp 19   Ht 5' 9\" (1.753 m)   Wt 200 lb 4.8 oz (90.9 kg)   SpO2 94%   BMI 29.58 kg/m²    Temp (24hrs), Av.6 °F (37 °C), Min:98.2 °F (36.8 °C), Max:99 °F (37.2 °C)      General appearance - well appearing, no in pain or distress   Mental status - alert and cooperative   Eyes - pupils equal and reactive, extraocular eye movements intact   Ears - bilateral TM's and external ear canals normal   Mouth - mucous membranes moist, pharynx normal without lesions   Neck - supple, no significant adenopathy   Lymphatics - no palpable lymphadenopathy, no hepatosplenomegaly   Chest - clear to auscultation, no wheezes, rales or rhonchi, symmetric air entry   Heart - normal rate, regular rhythm, normal S1, S2, no murmurs  Abdomen - soft, nontender, nondistended, no masses or organomegaly   Neurological - alert, oriented, normal speech, no focal findings or movement disorder noted   Musculoskeletal -intact dressing of the left lower extremity after ankle fracture surgery  Extremities - peripheral pulses normal, no pedal edema, no clubbing or cyanosis   Skin - normal coloration and turgor, no rashes, no suspicious skin lesions noted ,    Labs:   Complete Blood Count:   Recent Labs     03/08/23  0511 03/09/23  0301 03/10/23  0403   WBC 9.2 9.1 8.3   HGB 14.8 13.1 13.5   MCV 85.2 82.5* 87.7    198 213   RBC 5.42 4.75 4.97   HCT 46.2 39.2* 43.6   MCH 27.3 27.6 27.2   MCHC 32.0 33.4 31.0   RDW 13.2 13.4 13.3   MPV 10.5 10.1 11.4        PT/INR:    Lab Results   Component Value Date/Time    PROTIME 11.1 03/08/2023 05:11 AM    INR 1.0 03/08/2023 05:11 AM     PTT:    Lab Results   Component Value Date/Time    APTT 24.3 03/08/2023 05:11 AM       Basal Metabolic Profile:   Recent Labs     03/08/23  0511 03/09/23  0301 03/10/23  0403    140 137   K 4.4 4.1 4.5   BUN 13 19 12   CREATININE 1.33* 1.36* 1.03    106 105   CO2 20 26 23        LFTs  Recent Labs     03/09/23 0301   ALKPHOS 92   ALT 12   AST 26   BILITOT 0.3   LABALBU 3.6         Imaging:  XR TIBIA FIBULA LEFT (2 VIEWS)    Result Date: 3/7/2023  1. Overall near anatomic alignment of the left ankle following ORIF. 2. 6 mm metallic foreign body in the soft tissues medial to the left tibial plateau. XR ANKLE LEFT (2 VIEWS)    Result Date: 3/7/2023  Minimally displaced medial malleolar fracture. Minimally displaced comminuted fracture of the distal fibular metadiaphysis. Minimal lateral displacement of the ankle mortise. Soft tissue swelling. XR ANKLE LEFT (2 VIEWS)    Result Date: 3/7/2023  Nondisplaced medial malleolar fracture.  Minimally displaced comminuted distal fibular metadiaphysis fracture. Soft tissue swelling. XR ANKLE LEFT (2 VIEWS)    Result Date: 3/7/2023  Nondisplaced medial malleolar fracture. Minimally displaced comminuted fracture of the distal fibular metadiaphysis. Soft tissue swelling. XR ANKLE LEFT (MIN 3 VIEWS)    Result Date: 3/7/2023  1. Overall near anatomic alignment of the left ankle following ORIF. 2. 6 mm metallic foreign body in the soft tissues medial to the left tibial plateau. XR ANKLE LEFT (MIN 3 VIEWS)    Result Date: 3/7/2023  Severe dislocation of the ankle mortise laterally with overriding. Comminuted fracture of the distal fibular metadiaphysis with overriding and angulation. Soft tissue swelling and apparent skin breakthrough medially. CT HEAD WO CONTRAST    Result Date: 3/7/2023  No acute intracranial abnormality. CT CERVICAL SPINE WO CONTRAST    Result Date: 3/7/2023  Multilevel cervical spondylosis and degenerative disc disease. Mild spinal stenosis at C5-6 and C6-7 Evidence of paracervical spasm No acute bony abnormalities are noted in the cervical and thoracic spine 1 cm intraosseous lucency in the posterior aspect of the right ilium. Smaller similar lucency seen in the lower posterior aspect of the left ilium. Small lytic lesions cannot be excluded. L4-5 and L5-S1 degenerative disc disease. There are erosive changes of the vertebral endplates and an underlying infectious/inflammatory process cannot be entirely excluded. RECOMMENDATIONS: Further evaluation of the lumbar spine should be obtained with MR imaging if clinically indicated. CT CHEST ABDOMEN PELVIS WO CONTRAST Additional Contrast? None    Result Date: 3/7/2023  1. No acute intrathoracic abnormality. 2. No acute intra-abdominal abnormality within the limitations of a noncontrast study. 3. Prostatomegaly. 4. Urinary bladder wall thickening, which may be due to chronic outlet obstruction. 5. Bilateral nonobstructing nephrolithiasis.      CT LUMBAR SPINE TRAUMA RECONSTRUCTION    Result Date: 3/7/2023  Multilevel cervical spondylosis and degenerative disc disease. Mild spinal stenosis at C5-6 and C6-7 Evidence of paracervical spasm No acute bony abnormalities are noted in the cervical and thoracic spine 1 cm intraosseous lucency in the posterior aspect of the right ilium. Smaller similar lucency seen in the lower posterior aspect of the left ilium. Small lytic lesions cannot be excluded. L4-5 and L5-S1 degenerative disc disease. There are erosive changes of the vertebral endplates and an underlying infectious/inflammatory process cannot be entirely excluded. RECOMMENDATIONS: Further evaluation of the lumbar spine should be obtained with MR imaging if clinically indicated. CT THORACIC SPINE TRAUMA RECONSTRUCTION    Result Date: 3/7/2023  Multilevel cervical spondylosis and degenerative disc disease. Mild spinal stenosis at C5-6 and C6-7 Evidence of paracervical spasm No acute bony abnormalities are noted in the cervical and thoracic spine 1 cm intraosseous lucency in the posterior aspect of the right ilium. Smaller similar lucency seen in the lower posterior aspect of the left ilium. Small lytic lesions cannot be excluded. L4-5 and L5-S1 degenerative disc disease. There are erosive changes of the vertebral endplates and an underlying infectious/inflammatory process cannot be entirely excluded. RECOMMENDATIONS: Further evaluation of the lumbar spine should be obtained with MR imaging if clinically indicated.        Impression:   Primary Problem  Open ankle fracture, left, type I or II, with nonunion, subsequent encounter  Active Hospital Problems    Diagnosis Date Noted    Open fracture of left ankle [S82.892B] 03/07/2023     Priority: Medium    Bradycardia [R00.1] 03/07/2023     Priority: Medium    Open ankle fracture, left, type I or II, with nonunion, subsequent encounter [S82.892M] 03/07/2023     Priority: Medium     Incidental finding of lucent lesions in the right and left ilium  Traumatic injury with fall leading to open fracture of left ankle status post reduction surgery  Due to 1 AV block status post placement of temporary pacemaker    Assessment and Plan:    Obtain MRI for better evaluation of interosseous lucent lesions of ileum seen on CT scan. This can be done as outpatient  Myeloma work-up negative so far  Patient to follow-up with heme-onc outpatient    Discussed with patient and Nurse. Thank you for asking us to see this patient.     Frank Kayser, MD  Internal Medicine Resident, PGY-3  [address]         3/10/2023, 3:57 PM

## 2023-03-10 NOTE — CARE COORDINATION
Per PT recommendation, pt will need RW and WC for home. Per OT recommendation pt will need shower chair. Mandie Barahonampf in to see pt and will complete C9 and fax to Mary with [facility]. Plan is to home independently. Per PT, pt will not need PT until cast is removed. PT given PCP list.     For DME - RW, WC, shower chair. [address] with [facility], they do not accept [address], they do not accept [place]. [address] [place], for area DME providers- she states that when she looks up the case number, [place] is not the [facility] and that there was a mix-up with the employer initially effecting the [employer]. Mary, [employer] Mgr notified. 200 Per Mary, if pt is ready to d/c today or over w/e, pt can be given w/c from [facility] closet if needed, as to not delay d/c.     1400 Per Dr Bridget Saxena, pt can discharge to home today. Pt updated on above issue with [employer]. Pt states he spoke with his brother and can borrow his brother's walker.

## 2023-03-10 NOTE — DISCHARGE SUMMARY
DISCHARGE SUMMARY:    PATIENT NAME:  Jeremiah Tam  YOB: 1960  MEDICAL RECORD NO. [id number]  DATE: 03/10/23  PRIMARY CARE PHYSICIAN: No primary care provider on file. ADMIT DATE:  3/7/2023    DISCHARGE DATE:  3/10/23  DISPOSITION:    ADMITTING DIAGNOSIS: Open fracture of left ankle, bradycardia      DIAGNOSIS:   Patient Active Problem List   Diagnosis    Open fracture of left ankle    Bradycardia    Open ankle fracture, left, type I or II, with nonunion, subsequent encounter       CONSULTANTS:  Cardiology, Electrophysiology, Orthopedic Surgery    PROCEDURES:   Open reduction and internal fixation, left bimalleolar ankle fracture. Temporary pacemaker placement via right femoral vein. HOSPITAL COURSE:   Jeremiah Tam is a 57 y.o. male who was admitted on 3/7/2023  Hospital Course:    Fall. Bradycardia. Inj: Open L tibiotalar fislocaiton, fibula fx    3/7: ortho reduction in ED. temporary pacemaker placed. OR-Left ankle ORIF, ORIF left syndesmosis. I&D skin to bone of left ankle. 3/8: dc labetalol and fent  3/9: dc venofer/epo, getting temporary pacer removed  3/10: plans for discharge. Labs and imaging were followed daily. On day of discharge Jeremiah Tam  was tolerating a regular diet  had adequate analgeia on oral medications  had no signs of complication. He was deemed medically stable for discharged to Home        PHYSICAL EXAMINATION:        Discharge Vitals:  height is 5' 9\" (1.753 m) and weight is 200 lb 4.8 oz (90.9 kg). His temperature is 98.2 °F (36.8 °C). His blood pressure is 140/81 (abnormal) and his pulse is 89. His respiration is 16 and oxygen saturation is 94%. Exam on day of discharge:  Constitutional:       General: He is not in acute distress. Appearance: Normal appearance. HENT:      Head: Normocephalic and atraumatic.       Nose: Nose normal.      Mouth/Throat:      Mouth: Mucous membranes are moist.   Eyes:      Pupils: Pupils are equal, round, and reactive to light. Cardiovascular:      Pulses: Normal pulses. Heart sounds: Normal heart sounds. Pulmonary:      Effort: Pulmonary effort is normal.      Breath sounds: Normal breath sounds. Abdominal:      General: Abdomen is flat. Musculoskeletal:         General: Signs of injury (Healing left ankle orif.) present. Normal range of motion. Cervical back: No rigidity. Skin:     General: Skin is warm and dry. Capillary Refill: Capillary refill takes less than 2 seconds. Neurological:      General: No focal deficit present. Mental Status: He is alert. Cranial Nerves: No cranial nerve deficit. Psychiatric:         Mood and Affect: Mood normal.         Behavior: Behavior normal.     LABS:     Recent Labs     03/08/23  0511 03/09/23  0301 03/10/23  0403   WBC 9.2 9.1 8.3   HGB 14.8 13.1 13.5   HCT 46.2 39.2* 43.6    198 213    140 137   K 4.4 4.1 4.5    106 105   CO2 20 26 23   BUN 13 19 12   CREATININE 1.33* 1.36* 1.03       DIAGNOSTIC TESTS:    XR CHEST (SINGLE VIEW FRONTAL)    Result Date: 3/8/2023  EXAMINATION: ONE XRAY VIEW OF THE CHEST 3/8/2023 6:25 pm COMPARISON: 03/07/2023 HISTORY: ORDERING SYSTEM PROVIDED HISTORY: follow up/monitoring TECHNOLOGIST PROVIDED HISTORY: follow up/monitoring Reason for Exam: upright port FINDINGS: The lungs are without acute focal process. There is no effusion or pneumothorax. The cardiomediastinal silhouette is stable. The osseous structures are stable. No acute process.      XR TIBIA FIBULA LEFT (2 VIEWS)    Result Date: 3/7/2023  EXAMINATION: 4 XRAY VIEWS OF THE LEFT TIBIA AND FIBULA; THREE XRAY VIEWS OF THE LEFT ANKLE 3/7/2023 6:57 pm COMPARISON: Earlier today HISTORY: ORDERING SYSTEM PROVIDED HISTORY: Trauma/Fracture TECHNOLOGIST PROVIDED HISTORY: In PACU please Trauma/Fracture; ORDERING SYSTEM PROVIDED HISTORY: Post-op PACU TECHNOLOGIST PROVIDED HISTORY: AP, lateral, mortise Post-op PACU FINDINGS: External cast material obscures fine osseous detail. Left ankle: Immediate postoperative changes following medial and lateral malleolar ORIF with syndesmotic fixation device in place. Overall alignment is near anatomic. Small butterfly fracture fragment involving the distal fibula is minimally displaced. Talar dome is intact. Left tibia/fibula: Distal postoperative changes as above. No abnormality of the proximal tibia or fibula. Normal alignment of the knee. 6 mm metallic foreign body in the medial soft tissues adjacent to the tibial plateau. 1. Overall near anatomic alignment of the left ankle following ORIF. 2. 6 mm metallic foreign body in the soft tissues medial to the left tibial plateau. XR ANKLE LEFT (2 VIEWS)    Result Date: 3/7/2023  EXAMINATION: 1 XRAY VIEWS OF THE LEFT ANKLE 3/7/2023 11:56 am COMPARISON: Left ankle radiographs performed 03/07/2023. HISTORY: ORDERING SYSTEM PROVIDED HISTORY: post reduction TECHNOLOGIST PROVIDED HISTORY: post reduction FINDINGS: There is a minimally displaced medial malleolar fracture. There is a minimally displaced comminuted fracture of the distal fibular metadiaphysis. There is minimal lateral displacement of the ankle mortise. There is soft tissue swelling. There is overlying splint material.     Minimally displaced medial malleolar fracture. Minimally displaced comminuted fracture of the distal fibular metadiaphysis. Minimal lateral displacement of the ankle mortise. Soft tissue swelling. XR ANKLE LEFT (2 VIEWS)    Result Date: 3/7/2023  EXAMINATION: 2 XRAY VIEWS OF THE LEFT ANKLE 3/7/2023 11:56 am COMPARISON: Left ankle radiographs performed 03/07/2023. HISTORY: ORDERING SYSTEM PROVIDED HISTORY: Post-Splint TECHNOLOGIST PROVIDED HISTORY: Post-Splint FINDINGS: There is a nondisplaced medial malleolar fracture. There is a minimally displaced comminuted fracture of the distal fibular metadiaphysis. There is an tonic alignment of the ankle mortise.   There is soft tissue swelling. There is overlying splint material.     Nondisplaced medial malleolar fracture. Minimally displaced comminuted distal fibular metadiaphysis fracture. Soft tissue swelling. XR ANKLE LEFT (2 VIEWS)    Result Date: 3/7/2023  EXAMINATION: 2 XRAY VIEWS OF THE LEFT ANKLE 3/7/2023 11:56 am COMPARISON: Left ankle radiographs performed 03/07/2023. HISTORY: ORDERING SYSTEM PROVIDED HISTORY: Post-Reduction TECHNOLOGIST PROVIDED HISTORY: Post-Reduction FINDINGS: There is a nondisplaced medial malleolar fracture. There is a minimally displaced comminuted fracture of the distal fibular metadiaphysis. There is normal alignment of the ankle mortise. There is diffuse soft tissue swelling. Nondisplaced medial malleolar fracture. Minimally displaced comminuted fracture of the distal fibular metadiaphysis. Soft tissue swelling. XR ANKLE LEFT (MIN 3 VIEWS)    Result Date: 3/7/2023  EXAMINATION: 4 XRAY VIEWS OF THE LEFT TIBIA AND FIBULA; THREE XRAY VIEWS OF THE LEFT ANKLE 3/7/2023 6:57 pm COMPARISON: Earlier today HISTORY: ORDERING SYSTEM PROVIDED HISTORY: Trauma/Fracture TECHNOLOGIST PROVIDED HISTORY: In PACU please Trauma/Fracture; ORDERING SYSTEM PROVIDED HISTORY: Post-op PACU TECHNOLOGIST PROVIDED HISTORY: AP, lateral, mortise Post-op PACU FINDINGS: External cast material obscures fine osseous detail. Left ankle: Immediate postoperative changes following medial and lateral malleolar ORIF with syndesmotic fixation device in place. Overall alignment is near anatomic. Small butterfly fracture fragment involving the distal fibula is minimally displaced. Talar dome is intact. Left tibia/fibula: Distal postoperative changes as above. No abnormality of the proximal tibia or fibula. Normal alignment of the knee. 6 mm metallic foreign body in the medial soft tissues adjacent to the tibial plateau.      1. Overall near anatomic alignment of the left ankle following ORIF. 2. 6 mm metallic foreign body in the soft tissues medial to the left tibial plateau. XR ANKLE LEFT (MIN 3 VIEWS)    Result Date: 3/7/2023  EXAMINATION: THREE XRAY VIEWS OF THE LEFT ANKLE 3/7/2023 9:57 am COMPARISON: None. HISTORY: ORDERING SYSTEM PROVIDED HISTORY: Open left ankle fracture TECHNOLOGIST PROVIDED HISTORY: Open left ankle fracture FINDINGS: There is dislocation of the ankle mortise laterally with overriding. There is a comminuted fracture of the distal fibular metadiaphysis with overriding and angulation. There is soft tissue swelling. Severe dislocation of the ankle mortise laterally with overriding. Comminuted fracture of the distal fibular metadiaphysis with overriding and angulation. Soft tissue swelling and apparent skin breakthrough medially. CT HEAD WO CONTRAST    Result Date: 3/7/2023  EXAMINATION: CT OF THE HEAD WITHOUT CONTRAST  3/7/2023 1:58 pm TECHNIQUE: CT of the head was performed without the administration of intravenous contrast. Automated exposure control, iterative reconstruction, and/or weight based adjustment of the mA/kV was utilized to reduce the radiation dose to as low as reasonably achievable. COMPARISON: None. HISTORY: ORDERING SYSTEM PROVIDED HISTORY: Fall from truck TECHNOLOGIST PROVIDED HISTORY: Fall from truck Decision Support Exception - unselect if not a suspected or confirmed emergency medical condition->Emergency Medical Condition (MA) FINDINGS: BRAIN/VENTRICLES: There is no acute intracranial hemorrhage, mass effect, or midline shift. There is satisfactory overall gray-white matter differentiation. The ventricular structures are symmetric and unremarkable. The infratentorial structures are unremarkable. ORBITS: The visualized portion of the orbits demonstrate no acute abnormality. SINUSES: The visualized paranasal sinuses and mastoid air cells demonstrate no acute abnormality. SOFT TISSUES/SKULL:  No acute abnormality of the visualized skull or soft tissues.      No acute intracranial abnormality. CT CERVICAL SPINE WO CONTRAST    Result Date: 3/7/2023  EXAMINATION: CT OF THE CERVICAL SPINE WITHOUT CONTRAST; CT OF THE LUMBAR SPINE WITHOUT CONTRAST; CT OF THE THORACIC SPINE WITHOUT CONTRAST 3/7/2023 10:58 am TECHNIQUE: CT of the cervical spine was performed without the administration of intravenous contrast. Multiplanar reformatted images are provided for review. Automated exposure control, iterative reconstruction, and/or weight based adjustment of the mA/kV was utilized to reduce the radiation dose to as low as reasonably achievable.; CT of the lumbar spine was performed without the administration of intravenous contrast. Multiplanar reformatted images are provided for review. Adjustment of mA and/or kV according to patient size was utilized. Automated exposure control, iterative reconstruction, and/or weight based adjustment of the mA/kV was utilized to reduce the radiation dose to as low as reasonably achievable.; CT of the thoracic spine was performed without the administration of intravenous contrast. Multiplanar reformatted images are provided for review. Automated exposure control, iterative reconstruction, and/or weight based adjustment of the mA/kV was utilized to reduce the radiation dose to as low as reasonably achievable. COMPARISON: None. HISTORY: ORDERING SYSTEM PROVIDED HISTORY: fall from truck TECHNOLOGIST PROVIDED HISTORY: fall from truck Decision Support Exception - unselect if not a suspected or confirmed emergency medical condition->Emergency Medical Condition (MA); ORDERING SYSTEM PROVIDED HISTORY: fall TECHNOLOGIST PROVIDED HISTORY: fall CT CERVICAL SPINE FINDINGS: The cervical spine demonstrates normal mineralization with straightening of the  cervical lordosis. There is no evidence of fracture or subluxation. There is loss of disc height with eburnation of the vertebral endplates at the L1-5, C1-5 levels.   There are anterior and posterior marginal osteophytes at multiple levels. Mild spinal stenosis at C5-6 and C6-7. Cloteal Navas There is bilateral facet hypertrophy at multiple levels throughout the cervical spine. The pedicles and posterior elements are otherwise intact. The prevertebral and paravertebral soft tissues are unremarkable. The atlanto-dens interval and dens are intact. The visualized lung apices are clear. Vascular calcifications are seen compatible with atherosclerotic disease. CT THORACIC AND LUMBAR SPINE FINDINGS: BONES/ALIGNMENT: Mild dextroscoliosis of the thoracic spine. There is normal alignment of the  lumbar spine. The vertebral body heights are maintained. 1 cm intraosseous lucency in the posterior aspect of the right ilium. Smaller similar lucency seen in the lower posterior aspect of the left ilium. DEGENERATIVE CHANGES:  Degenerative disc disease noted at  L4-L5 and L5-S1 with loss of height and subcortical erosive changes of the articulating endplates. The spinal canal appears to be patent. There are mild posterior marginal osteophytes with associated broad-based disc bulges at these levels with associated facet hypertrophy and ligamentous hypertrophy. The neural foramina appear to remain patent. SOFT TISSUES/RETROPERITONEUM: Punctate nonobstructing bilateral renal stones. No paraspinal mass is seen. Vascular calcifications are seen compatible with atherosclerotic disease. Multilevel cervical spondylosis and degenerative disc disease. Mild spinal stenosis at C5-6 and C6-7 Evidence of paracervical spasm No acute bony abnormalities are noted in the cervical and thoracic spine 1 cm intraosseous lucency in the posterior aspect of the right ilium. Smaller similar lucency seen in the lower posterior aspect of the left ilium. Small lytic lesions cannot be excluded. L4-5 and L5-S1 degenerative disc disease. There are erosive changes of the vertebral endplates and an underlying infectious/inflammatory process cannot be entirely excluded. RECOMMENDATIONS: Further evaluation of the lumbar spine should be obtained with MR imaging if clinically indicated. FLUORO FOR SURGICAL PROCEDURES    Result Date: 3/7/2023  Radiology exam is complete. No Radiologist dictation. Please follow up with ordering provider. CT CHEST ABDOMEN PELVIS WO CONTRAST Additional Contrast? None    Result Date: 3/7/2023  EXAMINATION: CT OF THE CHEST, ABDOMEN, AND PELVIS WITHOUT CONTRAST 3/7/2023 1:58 pm TECHNIQUE: CT of the chest, abdomen and pelvis was performed without the administration of intravenous contrast. Multiplanar reformatted images are provided for review. Automated exposure control, iterative reconstruction, and/or weight based adjustment of the mA/kV was utilized to reduce the radiation dose to as low as reasonably achievable. COMPARISON: None HISTORY: ORDERING SYSTEM PROVIDED HISTORY: Fall from truck, bradycardia TECHNOLOGIST PROVIDED HISTORY: Fall from truck, bradycardia Decision Support Exception - unselect if not a suspected or confirmed emergency medical condition->Emergency Medical Condition (MA) FINDINGS: Chest: Mediastinum: Heart size is normal without pericardial effusion. Pacing lead is noted in the right ventricle. No mediastinal hematoma. No mediastinal lymphadenopathy. Lungs/pleura: There is no pleural effusion. There is no pneumothorax. There is no pulmonary contusion or laceration. Subsegmental atelectasis is noted in the dependent lung bases. Soft Tissues/Bones: Thoracic spine findings are better described on same day CT of the thoracic spine. No displaced rib fracture. Lipoma is noted in the right posterior chest soft tissues. Abdomen/Pelvis: Organs: Within the limitations of a noncontrast examination, no acute abnormality within the liver, gallbladder, spleen, pancreas, or adrenal glands. There are bilateral nonobstructing renal stones. No obstructing stone or hydronephrosis. GI/Bowel: The stomach is partially distended.   The small bowel is nondilated. The colon is nondilated. The appendix is normal in caliber. Pelvis: The bladder is partially distended with mild wall thickening. No vesicular stone. The prostate is enlarged. Peritoneum/Retroperitoneum: No ascites or pneumoperitoneum. Atherosclerosis in the nondilated abdominal aorta. Bones/Soft Tissues: No acute osseous abnormality. Lumbar spine findings are better described on same day CT. 1. No acute intrathoracic abnormality. 2. No acute intra-abdominal abnormality within the limitations of a noncontrast study. 3. Prostatomegaly. 4. Urinary bladder wall thickening, which may be due to chronic outlet obstruction. 5. Bilateral nonobstructing nephrolithiasis. CT LUMBAR SPINE TRAUMA RECONSTRUCTION    Result Date: 3/7/2023  EXAMINATION: CT OF THE CERVICAL SPINE WITHOUT CONTRAST; CT OF THE LUMBAR SPINE WITHOUT CONTRAST; CT OF THE THORACIC SPINE WITHOUT CONTRAST 3/7/2023 10:58 am TECHNIQUE: CT of the cervical spine was performed without the administration of intravenous contrast. Multiplanar reformatted images are provided for review. Automated exposure control, iterative reconstruction, and/or weight based adjustment of the mA/kV was utilized to reduce the radiation dose to as low as reasonably achievable.; CT of the lumbar spine was performed without the administration of intravenous contrast. Multiplanar reformatted images are provided for review. Adjustment of mA and/or kV according to patient size was utilized. Automated exposure control, iterative reconstruction, and/or weight based adjustment of the mA/kV was utilized to reduce the radiation dose to as low as reasonably achievable.; CT of the thoracic spine was performed without the administration of intravenous contrast. Multiplanar reformatted images are provided for review.  Automated exposure control, iterative reconstruction, and/or weight based adjustment of the mA/kV was utilized to reduce the radiation dose to as low as reasonably achievable. COMPARISON: None. HISTORY: ORDERING SYSTEM PROVIDED HISTORY: fall from truck TECHNOLOGIST PROVIDED HISTORY: fall from truck Decision Support Exception - unselect if not a suspected or confirmed emergency medical condition->Emergency Medical Condition (MA); ORDERING SYSTEM PROVIDED HISTORY: fall TECHNOLOGIST PROVIDED HISTORY: fall CT CERVICAL SPINE FINDINGS: The cervical spine demonstrates normal mineralization with straightening of the  cervical lordosis. There is no evidence of fracture or subluxation. There is loss of disc height with eburnation of the vertebral endplates at the G2-4, P6-1 levels. There are anterior and posterior marginal osteophytes at multiple levels. Mild spinal stenosis at C5-6 and C6-7. [place] There is bilateral facet hypertrophy at multiple levels throughout the cervical spine. The pedicles and posterior elements are otherwise intact. The prevertebral and paravertebral soft tissues are unremarkable. The atlanto-dens interval and dens are intact. The visualized lung apices are clear. Vascular calcifications are seen compatible with atherosclerotic disease. CT THORACIC AND LUMBAR SPINE FINDINGS: BONES/ALIGNMENT: Mild dextroscoliosis of the thoracic spine. There is normal alignment of the  lumbar spine. The vertebral body heights are maintained. 1 cm intraosseous lucency in the posterior aspect of the right ilium. Smaller similar lucency seen in the lower posterior aspect of the left ilium. DEGENERATIVE CHANGES:  Degenerative disc disease noted at  L4-L5 and L5-S1 with loss of height and subcortical erosive changes of the articulating endplates. The spinal canal appears to be patent. There are mild posterior marginal osteophytes with associated broad-based disc bulges at these levels with associated facet hypertrophy and ligamentous hypertrophy. The neural foramina appear to remain patent. SOFT TISSUES/RETROPERITONEUM: Punctate nonobstructing bilateral renal stones. No paraspinal mass is seen. Vascular calcifications are seen compatible with atherosclerotic disease. Multilevel cervical spondylosis and degenerative disc disease. Mild spinal stenosis at C5-6 and C6-7 Evidence of paracervical spasm No acute bony abnormalities are noted in the cervical and thoracic spine 1 cm intraosseous lucency in the posterior aspect of the right ilium. Smaller similar lucency seen in the lower posterior aspect of the left ilium. Small lytic lesions cannot be excluded. L4-5 and L5-S1 degenerative disc disease. There are erosive changes of the vertebral endplates and an underlying infectious/inflammatory process cannot be entirely excluded. RECOMMENDATIONS: Further evaluation of the lumbar spine should be obtained with MR imaging if clinically indicated. CT THORACIC SPINE TRAUMA RECONSTRUCTION    Result Date: 3/7/2023  EXAMINATION: CT OF THE CERVICAL SPINE WITHOUT CONTRAST; CT OF THE LUMBAR SPINE WITHOUT CONTRAST; CT OF THE THORACIC SPINE WITHOUT CONTRAST 3/7/2023 10:58 am TECHNIQUE: CT of the cervical spine was performed without the administration of intravenous contrast. Multiplanar reformatted images are provided for review. Automated exposure control, iterative reconstruction, and/or weight based adjustment of the mA/kV was utilized to reduce the radiation dose to as low as reasonably achievable.; CT of the lumbar spine was performed without the administration of intravenous contrast. Multiplanar reformatted images are provided for review. Adjustment of mA and/or kV according to patient size was utilized. Automated exposure control, iterative reconstruction, and/or weight based adjustment of the mA/kV was utilized to reduce the radiation dose to as low as reasonably achievable.; CT of the thoracic spine was performed without the administration of intravenous contrast. Multiplanar reformatted images are provided for review.  Automated exposure control, iterative reconstruction, and/or weight based adjustment of the mA/kV was utilized to reduce the radiation dose to as low as reasonably achievable. COMPARISON: None. HISTORY: ORDERING SYSTEM PROVIDED HISTORY: fall from truck TECHNOLOGIST PROVIDED HISTORY: fall from truck Decision Support Exception - unselect if not a suspected or confirmed emergency medical condition->Emergency Medical Condition (MA); ORDERING SYSTEM PROVIDED HISTORY: fall TECHNOLOGIST PROVIDED HISTORY: fall CT CERVICAL SPINE FINDINGS: The cervical spine demonstrates normal mineralization with straightening of the  cervical lordosis. There is no evidence of fracture or subluxation. There is loss of disc height with eburnation of the vertebral endplates at the P2-6, X0-6 levels. There are anterior and posterior marginal osteophytes at multiple levels. Mild spinal stenosis at C5-6 and C6-7. Hilda Raddle There is bilateral facet hypertrophy at multiple levels throughout the cervical spine. The pedicles and posterior elements are otherwise intact. The prevertebral and paravertebral soft tissues are unremarkable. The atlanto-dens interval and dens are intact. The visualized lung apices are clear. Vascular calcifications are seen compatible with atherosclerotic disease. CT THORACIC AND LUMBAR SPINE FINDINGS: BONES/ALIGNMENT: Mild dextroscoliosis of the thoracic spine. There is normal alignment of the  lumbar spine. The vertebral body heights are maintained. 1 cm intraosseous lucency in the posterior aspect of the right ilium. Smaller similar lucency seen in the lower posterior aspect of the left ilium. DEGENERATIVE CHANGES:  Degenerative disc disease noted at  L4-L5 and L5-S1 with loss of height and subcortical erosive changes of the articulating endplates. The spinal canal appears to be patent. There are mild posterior marginal osteophytes with associated broad-based disc bulges at these levels with associated facet hypertrophy and ligamentous hypertrophy.   The neural foramina appear to remain patent. SOFT TISSUES/RETROPERITONEUM: Punctate nonobstructing bilateral renal stones. No paraspinal mass is seen. Vascular calcifications are seen compatible with atherosclerotic disease. Multilevel cervical spondylosis and degenerative disc disease. Mild spinal stenosis at C5-6 and C6-7 Evidence of paracervical spasm No acute bony abnormalities are noted in the cervical and thoracic spine 1 cm intraosseous lucency in the posterior aspect of the right ilium. Smaller similar lucency seen in the lower posterior aspect of the left ilium. Small lytic lesions cannot be excluded. L4-5 and L5-S1 degenerative disc disease. There are erosive changes of the vertebral endplates and an underlying infectious/inflammatory process cannot be entirely excluded. RECOMMENDATIONS: Further evaluation of the lumbar spine should be obtained with MR imaging if clinically indicated. DISCHARGE INSTRUCTIONS     Discharge Medications:        Medication List        START taking these medications      gabapentin 100 MG capsule  Commonly known as: NEURONTIN  Take 1 capsule by mouth in the morning and 1 capsule at noon and 1 capsule in the evening. Do all this for 10 days. oxyCODONE 5 MG immediate release tablet  Commonly known as: ROXICODONE  Take 1 tablet by mouth every 8 hours as needed for Pain for up to 5 days. Max Daily Amount: 15 mg     vitamin D 1.25 MG (27739 UT) Caps capsule  Commonly known as: ERGOCALCIFEROL  Take 1 capsule by mouth once a week for 8 doses               Where to Get Your Medications        [facility], [address] [place] [address], [address] [address]      Phone: [phone]   gabapentin 100 MG capsule  oxyCODONE 5 MG immediate release tablet  vitamin D 1.25 MG (77739 UT) Caps capsule       Diet: ADULT DIET;  Regular  ADULT ORAL NUTRITION SUPPLEMENT; Lunch; Standard High Calorie/High Protein Oral Supplement diet as tolerated  Activity: As instructed WEIGHT BEARING STATUS: Weight bearing as tolerated  Wound Care: Daily and as needed.     DISPOSITION: Home    Follow-up:  Celeste Young DO  [address] [facility] [address]  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 [phone]  Schedule an appointment as soon as possible for a visit          SIGNED:  Casey Kanner, MD   3/10/2023, 2:08 PM  Time Spent for discharge: 35 minutes

## 2023-03-10 NOTE — PROGRESS NOTES
Ryann Back was evaluated today and a DME order was entered for a wheeled walker because he requires this to successfully complete daily living tasks of ambulating. A wheeled walker is necessary due to the patient's unsteady gait, upper body weakness, and inability to  an ambulation device; and he can ambulate only by pushing a walker instead of a lesser assistive device such as a cane, crutch, or standard walker. The need for this equipment was discussed with the patient and he understands and is in agreement. Irineo Toribio requires crutches due to impaired ambulation and for increased stability in order to participate in or complete daily living tasks of: ambulating. The patient is able to safely use the crutches and the functional mobility deficit can be sufficiently resolve.      Franklin Hudson MD  PGY-3 General Surgery  1:47 PM 03/10/23

## 2023-03-10 NOTE — PROGRESS NOTES
PROGRESS NOTE      PATIENT NAME: Meli Denton RECORD NO. [id number]  DATE: 3/10/2023    HD: # 3    Patient Active Problem List   Diagnosis    Open fracture of left ankle    Bradycardia    Open ankle fracture, left, type I or II, with nonunion, subsequent encounter       DIAGNOSIS AND PLAN  Neuro:    Tylenol, roxicodone 5mg q 6 PRN, gabapentin 100 mg Q8 hours     CV  Type II heart block -temporary pacer removed per EP recommendations     EP recommendations: plan for outpatient follow up with Holter monitor. Pulm  Incentive spirometry   Sating > 95% on RA     GI/Nutrition  Regular adult diet     Renal/lytes  137/4.5/105/23/12/1.03  Replace electrolytes PRN  Keep LR at 60cc/hr  UOP 3330 mL, no jo     Heme  DVT prophylaxis-Heparin 5000 mg TID  Hgb: 13.5  [employer] -refusal of all blood products  Discontinued venofer and epo per pharmacy's recommedations due to high Hgb. 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 Heme following. 7.   Endocrine        1. Glucose goal < 180            Currently: 126     Musculoskeletal: type 3A open bimalleolar fracture of left ankle and left ankle syndesmotic injury, s/p I&D, ORIF, POD 2  Medial malleolar fx, distal fibular fx   S/p ORIF left ankle   NWB LLE  Splint on LLE, maintain  ICE/elevate for pain/swelling  PT/OT  F/u with Dr. Conchita Romero in 14 days  [id number] to discharge from ortho perspective pending PT/OT eval and if medically stable. Skin  Dressing per orthopedic surgery  Monitor for wound breakdown      Micro  Monitor for fever, afebrile Tmax 99, completed ancef. Family/dispo  Consider dispo as EP removed temporary pacemaker and ortho is ok for dispo. Pending PT/OT evaluation.      Lines  Left femoral temporary pacer, PIV                   Chief Complaint: \"Heart block and left ankle ORIF\"    SUBJECTIVE  The patient denied any chest pain, feelings of heart palpitations, leg pain, no other acute complaints at this time. Resting comfortably this morning. OBJECTIVE  VITALS:   Vitals:    03/10/23 0445   BP:    Pulse:    Resp: 18   Temp:    SpO2:      Physical Exam  Constitutional:       General: He is not in acute distress. Appearance: Normal appearance. HENT:      Head: Normocephalic and atraumatic. Nose: Nose normal.      Mouth/Throat:      Mouth: Mucous membranes are moist.   Eyes:      Pupils: Pupils are equal, round, and reactive to light. Cardiovascular:      Pulses: Normal pulses. Heart sounds: Normal heart sounds. Pulmonary:      Effort: Pulmonary effort is normal.      Breath sounds: Normal breath sounds. Abdominal:      General: Abdomen is flat. Musculoskeletal:         General: Signs of injury (Healing left ankle orif.) present. Normal range of motion. Cervical back: No rigidity. Skin:     General: Skin is warm and dry. Capillary Refill: Capillary refill takes less than 2 seconds. Neurological:      General: No focal deficit present. Mental Status: He is alert. Cranial Nerves: No cranial nerve deficit. Psychiatric:         Mood and Affect: Mood normal.         Behavior: Behavior normal.         LAB:  CBC:   Recent Labs     03/08/23  0511 03/09/23  0301 03/10/23  0403   WBC 9.2 9.1 8.3   HGB 14.8 13.1 13.5   HCT 46.2 39.2* 43.6   MCV 85.2 82.5* 87.7    198 213     BMP:   Recent Labs     03/08/23  0511 03/09/23  0301 03/10/23  0403    140 137   K 4.4 4.1 4.5    106 105   CO2 20 26 23   BUN 13 19 12   CREATININE 1.33* 1.36* 1.03   GLUCOSE 153* 120* 126*       RADIOLOGY:  CXR:   The lungs are without acute focal process. There is no effusion or   pneumothorax. The cardiomediastinal silhouette is stable. The osseous   structures are stable.      EKG 3/7/23  Sinus rhythm with 2:1 AV block  Minimal voltage criteria for LVH, may be normal variant  Septal infarct , age undetermined    XR tibia fibular left 3/7/23:   1. Overall near anatomic alignment of the left ankle following ORIF.   2. 6 mm metallic foreign body in the soft tissues medial to the left tibial   plateau. Natanael Ashley MD  3/10/2023, 7:13 AM               Trauma Attending Holden Burns      I have reviewed the above GCS note(s) and confirmed the key elements of the medical history and physical exam. I have seen and examined the pt. I have discussed the findings, established the care plan and recommendations with Resident.       Vonda Boyd DO  3/10/2023  5:31 PM

## 2023-03-17 NOTE — PROCEDURES
[address], [facility] 30                                 HOLTER MONITOR    PATIENT NAME: PEDRO SHANKS                   :        1960  MED REC NO:   [id number]                             ROOM:       [facility]  ACCOUNT NO:   [de-identified]                           ADMIT DATE: 2023  PROVIDER:     Gustavo Lopez    HOLTER MONITOR 48-HOURS    DATE OF STUDY:  2023    INDICATIONS: Bradycardia    ORDERING PROVIDER: Dr. Uli Ham: Dr. Solorio Primus:    1. Normal sinus rhythm with Sinus bradycardia and Sinus tachycardia  2. Average heart rate 86 bpm  3. First degree AV block  4. Second degree AV block Mobitz type I  5. Rare Supraventricular ectopy  6.  Rare Ventricular ectopy            [facility]    D: 2023 14:57:49       T: 2023 15:04:27     [id number]  Job#: [id number]     Doc#: Unknown    CC:

## 2023-03-22 ENCOUNTER — OFFICE VISIT (OUTPATIENT)
Dept: ORTHOPEDIC SURGERY | Age: 63
End: 2023-03-22

## 2023-03-22 VITALS — HEIGHT: 69 IN | WEIGHT: 200 LBS | BODY MASS INDEX: 29.62 KG/M2

## 2023-03-22 DIAGNOSIS — S82.892B TYPE I OR II OPEN FRACTURE OF LEFT ANKLE, INITIAL ENCOUNTER: ICD-10-CM

## 2023-03-22 DIAGNOSIS — G89.18 POST-OP PAIN: Primary | ICD-10-CM

## 2023-03-22 PROCEDURE — 99024 POSTOP FOLLOW-UP VISIT: CPT | Performed by: STUDENT IN AN ORGANIZED HEALTH CARE EDUCATION/TRAINING PROGRAM

## 2023-03-28 ENCOUNTER — TELEPHONE (OUTPATIENT)
Dept: ORTHOPEDIC SURGERY | Age: 63
End: 2023-03-28

## 2023-03-28 NOTE — TELEPHONE ENCOUNTER
DATE OF PROCEDURE:  03/07/2023     PREOPERATIVE DIAGNOSIS:  Type 3A open bimalleolar fracture of left  ankle. POSTOPERATIVE DIAGNOSES:  1. Type 3A open bimalleolar fracture of left ankle. 2.  Left ankle syndesmotic injury. PROCEDURES:  1. Open reduction and internal fixation, left bimalleolar ankle  fracture. 2.  Open reduction and internal fixation of left syndesmosis. 3.  Irrigation, excisional debridement of skin down to and including the  bone of open left ankle fracture. 4.  Stress examination of left ankle under anesthesia with independent  interpretation of imaging. 5.  Complex wound closure, medial left ankle, measuring 6 cm. Patient called in stating the he has been having increase muscle spasms in his leg. States that he has taken the gabapentin.  Please advise

## 2023-03-29 ENCOUNTER — TELEPHONE (OUTPATIENT)
Dept: ONCOLOGY | Age: 63
End: 2023-03-29

## 2023-03-29 ENCOUNTER — OFFICE VISIT (OUTPATIENT)
Dept: ONCOLOGY | Age: 63
End: 2023-03-29
Payer: COMMERCIAL

## 2023-03-29 VITALS
DIASTOLIC BLOOD PRESSURE: 90 MMHG | BODY MASS INDEX: 28.07 KG/M2 | TEMPERATURE: 97.4 F | RESPIRATION RATE: 16 BRPM | WEIGHT: 190.1 LBS | SYSTOLIC BLOOD PRESSURE: 157 MMHG | HEART RATE: 89 BPM

## 2023-03-29 DIAGNOSIS — M89.8X5 LYTIC BONE LESION OF HIP: ICD-10-CM

## 2023-03-29 DIAGNOSIS — S82.892M: Primary | ICD-10-CM

## 2023-03-29 PROCEDURE — 99215 OFFICE O/P EST HI 40 MIN: CPT | Performed by: INTERNAL MEDICINE

## 2023-03-29 PROCEDURE — 99211 OFF/OP EST MAY X REQ PHY/QHP: CPT | Performed by: INTERNAL MEDICINE

## 2023-03-29 RX ORDER — CYCLOBENZAPRINE HCL 10 MG
10 TABLET ORAL 3 TIMES DAILY PRN
Qty: 30 TABLET | Refills: 0 | Status: SHIPPED | OUTPATIENT
Start: 2023-03-29 | End: 2023-04-08

## 2023-03-29 NOTE — PROGRESS NOTES
General appearance - well appearing, no in pain or distress   Mental status - alert and cooperative   Eyes - pupils equal and reactive, extraocular eye movements intact   Ears - bilateral TM's and external ear canals normal   Mouth - mucous membranes moist, pharynx normal without lesions   Neck - supple, no significant adenopathy   Lymphatics - no palpable lymphadenopathy, no hepatosplenomegaly   Chest - clear to auscultation, no wheezes, rales or rhonchi, symmetric air entry   Heart - normal rate, regular rhythm, normal S1, S2, no murmurs, rubs, clicks or gallops   Abdomen - soft, nontender, nondistended, no masses or organomegaly   Neurological - alert, oriented, normal speech, no focal findings or movement disorder noted   Musculoskeletal - no joint tenderness, deformity or swelling   Extremities - peripheral pulses normal, no pedal edema, no clubbing or cyanosis   Skin - normal coloration and turgor, no rashes, no suspicious skin lesions noted ,      LABORATORY DATA:     Lab Results   Component Value Date    WBC 8.3 03/10/2023    HGB 13.5 03/10/2023    HCT 43.6 03/10/2023    MCV 87.7 03/10/2023     03/10/2023    LYMPHOPCT 18 (L) 03/10/2023    RBC 4.97 03/10/2023    MCH 27.2 03/10/2023    MCHC 31.0 03/10/2023    RDW 13.3 03/10/2023    MONOPCT 9 03/10/2023    BASOPCT 1 03/10/2023    NEUTROABS 5.87 03/10/2023    LYMPHSABS 1.49 03/10/2023    MONOSABS 0.74 03/10/2023    EOSABS 0.04 03/10/2023    BASOSABS 0.07 03/10/2023         Chemistry        Component Value Date/Time     03/10/2023 0403    K 4.5 03/10/2023 0403     03/10/2023 0403    CO2 23 03/10/2023 0403    BUN 12 03/10/2023 0403    CREATININE 1.03 03/10/2023 0403        Component Value Date/Time    CALCIUM 9.0 03/10/2023 0403    ALKPHOS 92 03/09/2023 0301    AST 26 03/09/2023 0301    ALT 12 03/09/2023 0301    BILITOT 0.3 03/09/2023 0301            PATHOLOGY DATA:         IMAGING DATA:      XR CHEST (SINGLE VIEW FRONTAL)  Narrative:

## 2023-03-29 NOTE — TELEPHONE ENCOUNTER
AVS from 3/29/23      MRI of pelvis     Rtc after MRI      Mri scheduled for 4/25 @ 2:30 pm     Rv scheduled for 5/24 @ 11:00 am     Pt was given AVS and appointment schedule    Electronically signed by Himanshu Bryant on 3/29/2023 at 11:46 AM

## 2023-04-24 DIAGNOSIS — S82.892B TYPE I OR II OPEN FRACTURE OF LEFT ANKLE, INITIAL ENCOUNTER: Primary | ICD-10-CM

## 2023-04-25 ENCOUNTER — HOSPITAL ENCOUNTER (OUTPATIENT)
Dept: MRI IMAGING | Age: 63
Discharge: HOME | End: 2023-04-27
Payer: COMMERCIAL

## 2023-04-25 DIAGNOSIS — M89.8X5 LYTIC BONE LESION OF HIP: ICD-10-CM

## 2023-04-25 LAB
EGFR, POC: >60 ML/MIN/1.73M2
POC CREATININE: 1.32 MG/DL (ref 0.51–1.19)

## 2023-04-25 PROCEDURE — 73723 MRI JOINT LWR EXTR W/O&W/DYE: CPT

## 2023-04-25 PROCEDURE — 6360000004 HC RX CONTRAST MEDICATION: Performed by: INTERNAL MEDICINE

## 2023-04-25 PROCEDURE — 82565 ASSAY OF CREATININE: CPT

## 2023-04-25 PROCEDURE — 2580000003 HC RX 258: Performed by: INTERNAL MEDICINE

## 2023-04-25 PROCEDURE — A9579 GAD-BASE MR CONTRAST NOS,1ML: HCPCS | Performed by: INTERNAL MEDICINE

## 2023-04-25 RX ORDER — SODIUM CHLORIDE 0.9 % (FLUSH) 0.9 %
10 SYRINGE (ML) INJECTION PRN
Status: DISCONTINUED | OUTPATIENT
Start: 2023-04-25 | End: 2023-04-28 | Stop reason: HOSPADM

## 2023-04-25 RX ADMIN — SODIUM CHLORIDE, PRESERVATIVE FREE 10 ML: 5 INJECTION INTRAVENOUS at 16:39

## 2023-04-25 RX ADMIN — GADOTERIDOL 14 ML: 279.3 INJECTION, SOLUTION INTRAVENOUS at 16:38

## 2023-04-28 ENCOUNTER — OFFICE VISIT (OUTPATIENT)
Dept: ORTHOPEDIC SURGERY | Age: 63
End: 2023-04-28

## 2023-04-28 VITALS — HEIGHT: 69 IN | WEIGHT: 190 LBS | BODY MASS INDEX: 28.14 KG/M2

## 2023-04-28 DIAGNOSIS — S82.842F BIMALLEOLAR ANKLE FRACTURE, LEFT, OPEN TYPE III, WITH ROUTINE HEALING, SUBSEQUENT ENCOUNTER: Primary | ICD-10-CM

## 2023-04-28 PROCEDURE — 99024 POSTOP FOLLOW-UP VISIT: CPT | Performed by: NURSE PRACTITIONER

## 2023-04-28 NOTE — PROGRESS NOTES
[facility]  [id number] [facility] [place]  Dept Phone: [phone]  Dept Fax: [phone]      Orthopaedic Trauma Clinic Follow Up      Subjective:   Date of Surgery: 3/7/2023    Javi Hatch is a 57y.o. year old male who presents to the clinic today for routine follow up 7 weeks post operatively from open reduction internal fixation of his left type 3A open bimalleolar ankle fracture. Patient presents today with his brother. Patient states he has been compliant with nonweightbearing to the left lower extremity at all times with the use of a walker. States he has been compliant with wearing the CAM boot when ambulating and at night with removal at rest and for hygiene. States he has no pain in the ankle just some stiffness but reports he has been working on ankle and toes range of motion. Denies any numbness or tingling except for numbness directly over medial ankle incision where the bone came through the skin. Denies any new injuries or falls. Review of Systems  Gen: no fever, chills, malaise  CV: no chest pain or palpitations  Resp: no cough or shortness of breath  GI: no nausea, vomiting, diarrhea, or constipation  Neuro: no seizures, vertigo, or headache  Msk: no left ankle pain   10 remaining systems reviewed and negative    Objective : There were no vitals filed for this visit. Body mass index is 28.06 kg/m². General: No acute distress, resting comfortably in the clinic  Neuro: alert. oriented  Eyes: Extra-ocular muscles intact  Pulm: Respirations unlabored and regular. Skin: warm, well perfused  Psych:   Patient has good fund of knowledge and displays understanding of exam, diagnosis, and plan. LLE:  Skin intact. Surgical incisions well approximated and healing without evidence of infection. Two small areas of moist granulation tissue along medial ankle incision without evidence of active drainage or infection.  Approximately neutral dorsiflexion and 15

## 2023-05-31 ENCOUNTER — OFFICE VISIT (OUTPATIENT)
Dept: ORTHOPEDIC SURGERY | Age: 63
End: 2023-05-31

## 2023-05-31 VITALS — BODY MASS INDEX: 28.14 KG/M2 | HEIGHT: 69 IN | WEIGHT: 190 LBS

## 2023-05-31 DIAGNOSIS — S82.842F BIMALLEOLAR ANKLE FRACTURE, LEFT, OPEN TYPE III, WITH ROUTINE HEALING, SUBSEQUENT ENCOUNTER: Primary | ICD-10-CM

## 2023-05-31 NOTE — PROGRESS NOTES
ankle fracture with retained hardware with unchanged alignment and no signs of hardware loosening or failure. Interval healing appreciated compared to previous films with osseous consolidation. No new osseous abnormality appreciated. Impression: Stable left bimalleolar ankle fracture status post fixation with interval healing     Assessment:   57y.o. year old male with left open 3A bimalleolar ankle fracture status post ORIF  Plan:      Patient seen and evaluated in clinic. At this time patient may begin weightbearing in boot and wean from boot as tolerated into regular shoe. Patient provided with referral to physical therapy to be conducted 2-3 times a week for up to 8 weeks. Patient encouraged to work on ankle range of motion several times per day and is okay to shower without boot on. Patient is scheduled for return to work on 8/1/2023 and instructed to call us for a work note or if any changes should occur that would delay his start or if he would like to start earlier. Patient instructed follow-up on as-needed basis. Patient understood and agreed with the plan with all questions being answered to the patient's satisfaction at the time of visit. Follow up:Return if symptoms worsen or fail to improve. No orders of the defined types were placed in this encounter.          Orders Placed This Encounter   Procedures    XR ANKLE LEFT (MIN 3 VIEWS)     Standing Status:   Future     Number of Occurrences:   1     Standing Expiration Date:   5/26/2024     Order Specific Question:   Reason for exam:     Answer:   monitor    [facility]     Referral Priority:   Routine     Referral Type:   Eval and Treat     Referral Reason:   Specialty Services Required     Requested Specialty:   Physical Therapist     Number of Visits Requested:   1         Electronically signed by Jackie Myers DO on 6/2/2023 at 9:57 AM

## 2023-06-08 ENCOUNTER — TELEPHONE (OUTPATIENT)
Dept: ORTHOPEDIC SURGERY | Age: 63
End: 2023-06-08

## 2023-06-08 NOTE — TELEPHONE ENCOUNTER
Called patient to inform him that therapy was approved and to see where patient wanted his order to be sent to for therapy. No answer, unable to leave [facility] due to mailbox being full.

## 2023-06-22 ENCOUNTER — HOSPITAL ENCOUNTER (OUTPATIENT)
Dept: PHYSICAL THERAPY | Age: 63
Setting detail: THERAPIES SERIES
Discharge: HOME | End: 2023-06-22
Payer: COMMERCIAL

## 2023-06-22 PROCEDURE — 97161 PT EVAL LOW COMPLEX 20 MIN: CPT

## 2023-06-22 PROCEDURE — 97110 THERAPEUTIC EXERCISES: CPT

## 2023-06-22 NOTE — CONSULTS
20 hold  - Weight Bearing Walking with Single Crutch  - 3 x daily - 7 x weekly    Pt. Education:  [x] Plans/Goals, Risks/Benefits discussed  [x] Home exercise program:   Method of Education: [x] Verbal  [x] Demo  [x] Written  Comprehension of Education:  [x] Verbalizes understanding. [x] Demonstrates understanding. [x] Needs Review. [] Demonstrates/verbalizes understanding of HEP/Ed previously given. Specific Instructions for next treatment[de-identified] Left ankle ROM, strengthening, gait training, work simulated activity when appropriate       Evaluation Complexity:  History (Personal factors, comorbidities) [] 0 [x] 1-2 [] 3+   Exam (limitations, restrictions) [] 1-2 [x] 3 [] 4+   Clinical presentation (progression) [x] Stable [] Evolving  [] Unstable   Decision Making [x] Low [] Moderate [] High    [x] Low Complexity [] Moderate Complexity [] High Complexity       Treatment Charges: Mins Units   [x] Evaluation       [x]  Low       []  Moderate       []  High 25 1   []  Modalities     [x]  Ther Exercise 15 1   []  Manual Therapy     []  Ther Activities     []  Aquatics     []  Vasocompression     []  Other       TOTAL TREATMENT TIME: 40      Time in:1650     Time out:1730    Electronically signed by: Malika Saldana PT      Thank you for your referral.    Physician Signature:________________________________Date:__________________  By signing above or cosigning this note, I have reviewed this plan of care and certify a need for medically necessary rehabilitation services.      *PLEASE SIGN ABOVE AND FAX BACK ALL PAGES*

## 2023-06-26 ENCOUNTER — HOSPITAL ENCOUNTER (OUTPATIENT)
Dept: PHYSICAL THERAPY | Age: 63
Setting detail: THERAPIES SERIES
Discharge: HOME | End: 2023-06-26
Payer: COMMERCIAL

## 2023-06-26 PROCEDURE — 97110 THERAPEUTIC EXERCISES: CPT

## 2023-06-26 PROCEDURE — 97016 VASOPNEUMATIC DEVICE THERAPY: CPT

## 2023-06-29 ENCOUNTER — HOSPITAL ENCOUNTER (OUTPATIENT)
Dept: PHYSICAL THERAPY | Age: 63
Setting detail: THERAPIES SERIES
Discharge: HOME | End: 2023-06-29
Payer: COMMERCIAL

## 2023-06-29 PROCEDURE — 97110 THERAPEUTIC EXERCISES: CPT

## 2023-06-29 PROCEDURE — 97016 VASOPNEUMATIC DEVICE THERAPY: CPT

## 2023-06-30 ENCOUNTER — HOSPITAL ENCOUNTER (OUTPATIENT)
Dept: PHYSICAL THERAPY | Age: 63
Setting detail: THERAPIES SERIES
Discharge: HOME | End: 2023-06-30
Payer: COMMERCIAL

## 2023-06-30 PROCEDURE — 97016 VASOPNEUMATIC DEVICE THERAPY: CPT

## 2023-06-30 PROCEDURE — 97110 THERAPEUTIC EXERCISES: CPT

## 2023-07-03 ENCOUNTER — HOSPITAL ENCOUNTER (OUTPATIENT)
Dept: PHYSICAL THERAPY | Age: 63
Setting detail: THERAPIES SERIES
Discharge: HOME | End: 2023-07-03
Payer: COMMERCIAL

## 2023-07-03 PROCEDURE — 97110 THERAPEUTIC EXERCISES: CPT

## 2023-07-03 PROCEDURE — 97016 VASOPNEUMATIC DEVICE THERAPY: CPT

## 2023-07-03 NOTE — FLOWSHEET NOTE
[x] [address]  [address] [facility].  [phone]  F: [phone] [] [address] [facility]  [address]   [address]  P: [phone]  F: [phone] [] [address]  [address] [facility]  P: [phone]  F: [phone] [] [facility]: [phone]  F: [phone] [] [address] [facility]  [address] [facility]   [address]   P: [phone]  F: [phone]  [] [address] [facility].   P: [phone]  F: [phone] [] 205 [facility]  [address] [place]    [address]  P: [phone]  F: [phone] [] [address] [facility]  [address] [facility]  [place]: [phone]  F: [phone] [] [address]  [place]: [phone]  F: [phone]      Physical Therapy Daily Treatment Note    Date:  7/3/2023  Patient Name:  Saida Gagnon    :  1960  MRN: [id number]  Physician: Inessa Terry DO/ Tanya Meza DO                                 Insurance: [facility] 24 visits 2-3x/week for 8 weeks -23  Medical Diagnosis: Bimalleolar ankle fracture, left, open type III, with routine healing, subsequent encounter (D57.582V [ICD-10-CM])                    Rehab Codes: Z64.546,U64.293, R26.2   Onset Date: Injury:3/7/2023                              Next 's appt: PRN  Visit# / total visits:     Cancels/No Shows: 0/0    Subjective:    Pain:  [x] Yes  [] No  Location: L ankle  Pain Rating: (0-10 scale) 0/10  Pain altered Tx:  [x] No  [] Yes  Action:  Comments:  Patient arrives

## 2023-07-05 ENCOUNTER — HOSPITAL ENCOUNTER (OUTPATIENT)
Dept: PHYSICAL THERAPY | Age: 63
Setting detail: THERAPIES SERIES
Discharge: HOME | End: 2023-07-05
Payer: COMMERCIAL

## 2023-07-05 PROCEDURE — 97016 VASOPNEUMATIC DEVICE THERAPY: CPT

## 2023-07-05 PROCEDURE — 97110 THERAPEUTIC EXERCISES: CPT

## 2023-07-05 NOTE — FLOWSHEET NOTE
[x] [address]  [address] [facility] [place].  [phone]  F: [phone] [] 204 [facility]  [address]   [address]  P: [phone]  F: [phone] [] [address]  [address] [facility]  P: [phone]  F: [phone] [] [facility] [place]: [phone]  F: [phone] [] 224 [facility]  [address] [facility]   [address]   P: [phone]  F: [phone]  [] [address] [facility].   P: [phone]  F: [phone] [] 205 [facility]  [address] [place]    [address]  P: [phone]  F: [phone] [] 224 [facility]  [address] [facility]  [place]: [phone]  F: [phone] [] [address] [facility] [address]  [place]: [phone]  F: [phone]      Physical Therapy Daily Treatment Note    Date:  2023  Patient Name:  Domingo Mejía    :  1960  MRN: [id number]  Physician: Coty Carranza DO/ Luiza Quiros, DO                                 Insurance: [address] [place] [address] 24 visits 2-3x/week for 8 weeks -23  Medical Diagnosis: Bimalleolar ankle fracture, left, open type III, with routine healing, subsequent encounter (M32.059N [ICD-10-CM])                    Rehab Codes: I15.290,U17.217, R26.2   Onset Date: Injury:3/7/2023                              Next Dr.'s appt: PRN  Visit# / total visits:     Cancels/No Shows: 0/0    Subjective:    Pain:  [x] Yes  [] No  Location: L ankle  Pain Rating: (0-10 scale) 3/10  Pain altered Tx:  [x] No  [] Yes  Action:  Comments:  Patient notes doing

## 2023-07-07 ENCOUNTER — HOSPITAL ENCOUNTER (OUTPATIENT)
Dept: PHYSICAL THERAPY | Age: 63
Setting detail: THERAPIES SERIES
Discharge: HOME | End: 2023-07-07
Payer: COMMERCIAL

## 2023-07-07 PROCEDURE — 97110 THERAPEUTIC EXERCISES: CPT

## 2023-07-07 PROCEDURE — 97016 VASOPNEUMATIC DEVICE THERAPY: CPT

## 2023-07-07 NOTE — FLOWSHEET NOTE
[x] [address]  4600 [facility] [place] [facility].  [phone]  F: [phone] [] 204 [facility]  [address]   [address]  P: [phone]  F: [phone] [] [address]  [address] [facility]  P: [phone]  F: [phone] [] [facility]: [phone]  F: [phone] [] 224 [facility]  One [facility]   [address]   P: [phone]  F: [phone]  [] [address] [facility].   P: [phone]  F: [phone] [] 205 [facility]  [address] [place]    [address]  P: [phone]  F: [phone] [] 224 [facility]  7[address] [facility]  [place]: [phone]  F: [phone] [] [address]  [place]: [phone]  F: [phone]      Physical Therapy Daily Treatment Note    Date:  2023  Patient Name:  Kwabena Avila    :  1960  MRN: [id number]  Physician: Esau Cano DO/ Morris Felix DO                                 Insurance: [facility] 24 visits 2-3x/week for 8 weeks -23  Medical Diagnosis: Bimalleolar ankle fracture, left, open type III, with routine healing, subsequent encounter (Y95.261A [ICD-10-CM])                    Rehab Codes: T37.178,W50.995, R26.2   Onset Date: Injury:3/7/2023                              Next 's appt: PRN  Visit# / total visits:     Cancels/No Shows: 0/0    Subjective:    Pain:  [x] Yes  [] No  Location: L ankle  Pain Rating: (0-10 scale) 3/10  Pain altered Tx:  [x] No  [] Yes  Action:  Comments:  Patient notes pain on

## 2023-07-12 ENCOUNTER — HOSPITAL ENCOUNTER (OUTPATIENT)
Dept: PHYSICAL THERAPY | Age: 63
Setting detail: THERAPIES SERIES
Discharge: HOME | End: 2023-07-12
Payer: COMMERCIAL

## 2023-07-12 PROCEDURE — 97110 THERAPEUTIC EXERCISES: CPT

## 2023-07-12 PROCEDURE — 97016 VASOPNEUMATIC DEVICE THERAPY: CPT

## 2023-07-12 NOTE — FLOWSHEET NOTE
[x] [address]  46[address] [facility].  [phone]  F: [phone] [] 204 [facility]  [address]   [address]  P: [phone]  F: [phone] [] [address]  151 [facility]  P: [phone]  F: [phone] [] [place]: [phone]  F: [phone] [] [address] [place] [employer]  One [facility]   [address]   P: [phone]  F: [phone]  [] 113[address] [facility].   P: [phone]  F: [phone] [] 205 [facility]  [address] [place]    [address]  P: [phone]  F: [phone] [] 224 [facility]  7[address] [facility]  [place]: [phone]  F: [phone] [] [address]  [place]: [phone]  F: [phone]      Physical Therapy Daily Treatment Note    Date:  2023  Patient Name:  Edwrado Reddy    :  1960  MRN: [id number]  Physician: Nati Lamas DO/ Zofia Boss DO                                 Insurance: [facility] 24 visits 2-3x/week for 8 weeks -23  Medical Diagnosis: Bimalleolar ankle fracture, left, open type III, with routine healing, subsequent encounter (S11.790M [ICD-10-CM])                    Rehab Codes: J28.186,X22.229, R26.2   Onset Date: Injury:3/7/2023                              Next 's appt: PRN  Visit# / total visits:     Cancels/No Shows: 0/0    Subjective:    Pain:  [x] Yes  [] No  Location: L ankle  Pain Rating: (0-10 scale) 3/10  Pain altered Tx:  [x] No  [] Yes  Action:  Comments:  Patient didn't

## 2023-07-14 ENCOUNTER — HOSPITAL ENCOUNTER (OUTPATIENT)
Dept: PHYSICAL THERAPY | Age: 63
Setting detail: THERAPIES SERIES
Discharge: HOME | End: 2023-07-14
Payer: COMMERCIAL

## 2023-07-14 PROCEDURE — 97016 VASOPNEUMATIC DEVICE THERAPY: CPT

## 2023-07-14 PROCEDURE — 97110 THERAPEUTIC EXERCISES: CPT

## 2023-07-14 NOTE — FLOWSHEET NOTE
Balance  [] Edema  [] Postural Deviations  [x] Gait Deviations  [] Other     STG: (to be met in 10 treatments)  ? Pain: 2/10 L ankle pain with ambulation. ? ROM:L ankle DF to 20 degrees to improve gait pattern. ? Strength: 5/5 L ankle all planes of motion to improve joint stability in standing. ? Function:Patient is ambulating with normal gait pattern. Independent with Home Exercise Programs     LTG: (to be met in 24 treatments)  Patient is able to climb up and down 12 inch step to simulate stepping into truck/forklift. Return to work full duty. Patient goals: Walk as normal as possible. Rehab Potential:  [x] Good  [] Fair  [] Poor              Suggested Professional Referral:  [x] No  [] Yes:  Barriers to Goal Achievement[de-identified]  [x] No  [] Yes:  Domestic Concerns:  [x] No  [] Yes:    Pt. Education:  [x] Yes  [] No  [x] Reviewed Prior HEP/Ed  Method of Education: [x] Verbal  [x] Demo  [] Written  Comprehension of Education:  [x] Verbalizes understanding. [x] Demonstrates understanding. [] Needs review. [x] Demonstrates/verbalizes HEP/Ed previously given. Access Code: [email or web address]  URL: [email or web address]  Date: 07/07/2023  Prepared by: [address]  - 1 x daily - 7 x weekly - 3 sets - 10 reps  - Heel raises  - 1 x daily - 7 x weekly - 3 sets - 10 reps  - Toe raises  - 1 x daily - 7 x weekly - 3 sets - 10 reps  - Standing March  - 1 x daily - 7 x weekly - 3 sets - 10 reps  - Hip abd  - 1 x daily - 7 x weekly - 3 sets - 10 reps  - Standing Hip Extension  - 1 x daily - 7 x weekly - 3 sets - 10 reps  - Hip flexion  - 1 x daily - 7 x weekly - 3 sets - 10 reps  - Standing HS curls  - 1 x daily - 7 x weekly - 3 sets - 10 reps  - Step Up  - 1 x daily - 7 x weekly - 3 sets - 10 reps  - Lateral Step Up  - 1 x daily - 7 x weekly - 3 sets - 10 reps  - Mini Squat with Counter Support  - 1 x daily - 7 x weekly - 3 sets - 10 reps    Access Code: [facility]  URL:

## 2023-07-17 ENCOUNTER — HOSPITAL ENCOUNTER (OUTPATIENT)
Dept: PHYSICAL THERAPY | Age: 63
Setting detail: THERAPIES SERIES
Discharge: HOME | End: 2023-07-17
Payer: COMMERCIAL

## 2023-07-17 PROCEDURE — 97016 VASOPNEUMATIC DEVICE THERAPY: CPT

## 2023-07-17 PROCEDURE — 97110 THERAPEUTIC EXERCISES: CPT

## 2023-07-19 ENCOUNTER — HOSPITAL ENCOUNTER (OUTPATIENT)
Dept: PHYSICAL THERAPY | Age: 63
Setting detail: THERAPIES SERIES
Discharge: HOME | End: 2023-07-19
Payer: COMMERCIAL

## 2023-07-19 PROCEDURE — 97110 THERAPEUTIC EXERCISES: CPT

## 2023-07-19 PROCEDURE — 97016 VASOPNEUMATIC DEVICE THERAPY: CPT

## 2023-07-19 NOTE — PRE-CERTIFICATION NOTE
[x] [facility] [place] - [facility] - [facility] &  Therapy  [address] [facility].  [phone]  F: [phone] [] 204 [facility]  [address] [place] [address] Rd   [address]  P: [phone]  F: [phone] [] [address]  [address] [place] [address]  P: [phone]  F: [phone] [] 224 [facility]   [address]   [place]: [phone]  F: [phone]           Christy Zuñiga   1960   [id number]    7/19/2023    Faxed notes as requested to Claims Dept at [facility] at [phone] from 7/5/23 to 7/19/23.     Electronically signed by Chayito Harrell PT on 7/19/2023 at 4:07 PM

## 2023-07-19 NOTE — FLOWSHEET NOTE
[x] [address]  4600 [facility].  [phone]  F: [phone] [] 204 [facility]  [address]   [address]  P: [phone]  F: [phone] [] [address]  [address] [facility]  P: [phone]  F: [phone] [] [place]: [phone]  F: [phone] [] 224 [facility]  [address] [facility]   [address]   P: [phone]  F: [phone]  [] [address] [facility].   P: [phone]  F: [phone] [] 205 [facility]  [address] [place]    [address]  P: [phone]  F: [phone] [] 224 [facility]  [address] [facility]  [place]: [phone]  F: [phone] [] [address] [facility] [address]  [place]: [phone]  F: [phone]      Physical Therapy Daily Treatment Note    Date:  2023  Patient Name:  Roxaan Nicholson    :  1960  MRN: [id number]  Physician: Cristina Millard DO/ Omega Chairez DO                                 Insurance: [facility] 24 visits 2-3x/week for 8 weeks -23  Medical Diagnosis: Bimalleolar ankle fracture, left, open type III, with routine healing, subsequent encounter (S19.166F [ICD-10-CM])                    Rehab Codes: R30.339,R31.343, R26.2   Onset Date: Injury:3/7/2023                              Next 's appt: PRN  Visit# / total visits:     Cancels/No Shows: 0/1    Subjective:    Pain:  [x] Yes  [] No  Location: L ankle  Pain Rating: (0-10 scale) 2/10  Pain altered Tx:  [x] No  [] Yes  Action:  Comments:  Patient arrives

## 2023-07-21 ENCOUNTER — HOSPITAL ENCOUNTER (OUTPATIENT)
Dept: PHYSICAL THERAPY | Age: 63
Setting detail: THERAPIES SERIES
Discharge: HOME | End: 2023-07-21
Payer: COMMERCIAL

## 2023-07-21 PROCEDURE — 97110 THERAPEUTIC EXERCISES: CPT

## 2023-07-21 PROCEDURE — 97016 VASOPNEUMATIC DEVICE THERAPY: CPT

## 2023-07-24 ENCOUNTER — HOSPITAL ENCOUNTER (OUTPATIENT)
Dept: PHYSICAL THERAPY | Age: 63
Setting detail: THERAPIES SERIES
Discharge: HOME | End: 2023-07-24
Payer: COMMERCIAL

## 2023-07-24 PROCEDURE — 97110 THERAPEUTIC EXERCISES: CPT

## 2023-07-24 PROCEDURE — 97140 MANUAL THERAPY 1/> REGIONS: CPT

## 2023-07-24 NOTE — FLOWSHEET NOTE
[x] [address]  4600 [facility].  [phone]  F: [phone] [] 204 [facility]  [address]   [address]  P: [phone]  F: [phone] [] [address]  151 [facility]  P: [phone]  F: [phone] [] [place]: [phone]  F: [phone] [] 224 [facility]  One [facility]   [address]   P: [phone]  F: [phone]  [] [address] [facility].   P: [phone]  F: [phone] [] 205 [facility]  [address] [place]    [address]  P: [phone]  F: [phone] [] 224 [facility]  7[address] [facility]  [place]: [phone]  F: [phone] [] [address]  [place]: [phone]  F: [phone]      Physical Therapy Daily Treatment Note    Date:  2023  Patient Name:  Freddie Rao    :  1960  MRN: [id number]  Physician: Eligio Gonzalez DO/ Julisa Phillip DO                                 Insurance: [facility] 24 visits 2-3x/week for 8 weeks -23  Medical Diagnosis: Bimalleolar ankle fracture, left, open type III, with routine healing, subsequent encounter (A16.169R [ICD-10-CM])                    Rehab Codes: O93.747,B68.303, R26.2   Onset Date: Injury:3/7/2023                              Next 's appt: PRN  Visit# / total visits:     Cancels/No Shows: 0/1    Subjective:    Pain:  [x] Yes  [] No  Location: L ankle  Pain Rating: (0-10 scale) 2/10  Pain altered Tx:  [x] No  [] Yes  Action:  Comments:  Pain is under

## 2023-07-26 ENCOUNTER — HOSPITAL ENCOUNTER (OUTPATIENT)
Dept: PHYSICAL THERAPY | Age: 63
Setting detail: THERAPIES SERIES
Discharge: HOME | End: 2023-07-26
Payer: COMMERCIAL

## 2023-07-26 PROCEDURE — 97110 THERAPEUTIC EXERCISES: CPT

## 2023-07-26 PROCEDURE — 97016 VASOPNEUMATIC DEVICE THERAPY: CPT

## 2023-07-26 NOTE — FLOWSHEET NOTE
tolerance    Problem list, as detailed above:   [x] ? Pain                       [x] ? ROM                      [x] ? Strength  [x] ? Flexibility:              [x] ? Function: LEFS 34% functional   [] ? Balance  [] Edema  [] Postural Deviations  [x] Gait Deviations  [] Other     STG: (to be met in 10 treatments)  ? Pain: 2/10 L ankle pain with ambulation. Ongoing, 5/10 along outside of foot  ? ROM:L ankle DF to 20 degrees to improve gait pattern. Not MET 2 degrees of DF  ? Strength: 5/5 L ankle all planes of motion to improve joint stability in standing. MET 5/5, except IV 4/5 with pain  ? Function:Patient is ambulating with normal gait pattern. Ongoing, carries in single crutch,   Independent with Home Exercise Programs MET, when he remembers to do them     LTG: (to be met in 24 treatments)  Patient is able to climb up and down 12 inch step to simulate stepping into truck/forklift. Return to work full duty. Patient goals: Walk as normal as possible. Rehab Potential:  [x] Good  [] Fair  [] Poor              Suggested Professional Referral:  [x] No  [] Yes:  Barriers to Goal Achievement[de-identified]  [x] No  [] Yes:  Domestic Concerns:  [x] No  [] Yes:    Pt. Education:  [x] Yes  [] No  [x] Reviewed Prior HEP/Ed  Method of Education: [x] Verbal  [x] Demo  [] Written  Comprehension of Education:  [x] Verbalizes understanding. [x] Demonstrates understanding. [] Needs review. [x] Demonstrates/verbalizes HEP/Ed previously given. Access Code: [email or web address]  URL: [email or web address]/  Date: 07/07/2023  Prepared by: [address]  - 1 x daily - 7 x weekly - 3 sets - 10 reps  - Heel raises  - 1 x daily - 7 x weekly - 3 sets - 10 reps  - Toe raises  - 1 x daily - 7 x weekly - 3 sets - 10 reps  - Standing March  - 1 x daily - 7 x weekly - 3 sets - 10 reps  - Hip abd  - 1 x daily - 7 x weekly - 3 sets - 10 reps  - Standing Hip Extension  - 1 x daily - 7 x weekly - 3 sets - 10

## 2023-07-28 ENCOUNTER — HOSPITAL ENCOUNTER (OUTPATIENT)
Dept: PHYSICAL THERAPY | Age: 63
Setting detail: THERAPIES SERIES
Discharge: HOME | End: 2023-07-28
Payer: COMMERCIAL

## 2023-07-28 PROCEDURE — 97016 VASOPNEUMATIC DEVICE THERAPY: CPT

## 2023-07-28 PROCEDURE — 97110 THERAPEUTIC EXERCISES: CPT

## 2023-07-28 NOTE — FLOWSHEET NOTE
change. [] Other:    [x] Patient would continue to benefit from skilled physical therapy services in order to: improve L ankle ROM, Strength, normalize gait and increase work tolerance    Problem list, as detailed above:   [x] ? Pain                       [x] ? ROM                      [x] ? Strength  [x] ? Flexibility:              [x] ? Function: LEFS 34% functional   [] ? Balance  [] Edema  [] Postural Deviations  [x] Gait Deviations  [] Other     STG: (to be met in 10 treatments)  ? Pain: 2/10 L ankle pain with ambulation. Ongoing, 5/10 along outside of foot  ? ROM:L ankle DF to 20 degrees to improve gait pattern. Not MET 2 degrees of DF  ? Strength: 5/5 L ankle all planes of motion to improve joint stability in standing. MET 5/5, except IV 4/5 with pain  ? Function:Patient is ambulating with normal gait pattern. Ongoing, carries in single crutch,   Independent with Home Exercise Programs MET, when he remembers to do them     LTG: (to be met in 24 treatments)  Patient is able to climb up and down 12 inch step to simulate stepping into truck/forklift. Return to work full duty. Patient goals: Walk as normal as possible. Rehab Potential:  [x] Good  [] Fair  [] Poor              Suggested Professional Referral:  [x] No  [] Yes:  Barriers to Goal Achievement[de-identified]  [x] No  [] Yes:  Domestic Concerns:  [x] No  [] Yes:    Pt. Education:  [x] Yes  [] No  [x] Reviewed Prior HEP/Ed  Method of Education: [x] Verbal  [x] Demo  [] Written  Comprehension of Education:  [x] Verbalizes understanding. [x] Demonstrates understanding. [] Needs review. [x] Demonstrates/verbalizes HEP/Ed previously given. Access Code: [email or web address]  URL: [email or web address]. [employer]/  Date: 07/07/2023  Prepared by: [address]  - 1 x daily - 7 x weekly - 3 sets - 10 reps  - Heel raises  - 1 x daily - 7 x weekly - 3 sets - 10 reps  - Toe raises  - 1 x daily - 7 x weekly - 3 sets - 10 reps  -

## 2023-07-31 ENCOUNTER — HOSPITAL ENCOUNTER (OUTPATIENT)
Dept: PHYSICAL THERAPY | Age: 63
Setting detail: THERAPIES SERIES
Discharge: HOME | End: 2023-07-31
Payer: COMMERCIAL

## 2023-07-31 NOTE — FLOWSHEET NOTE
[x] [facility] ([facility]) - [facility] &  Therapy  [address] [facility].  [phone]  F: [phone] [] 204 [facility]  [address]   [address]  P: [phone]  F: [phone] [] [address]  [address]  P: [phone]  F: [phone] [] [address]  [address]   [place]: [phone]  F: [phone]           Joel Service   1960   [id number]    7/31/2023    Patient was checked in at 9:59 am. Using teams, writer messaged  to send patient back at 10:01 am. Writer never seen patient come back to clinic. Therefore went to waiting room and bathroom on floor to check, writer saw that patient was not there. 15 minutes after patient's appointment time, called patient and left a message asking that we missed the patient and did not see him. No call back. At 10:55 am, writer seen that getting a drink of water on his way out of the clinic. When asked where patient was he said he \"treated himself\" this date. Educated patient that writer was looking for him and called him with no answer. Also noted that he should have seek out assistance as he was by himself. Therefore, no visit or charges noted this date.     Electronically signed by Gadiel Dill PTA on 7/31/2023 at 11:13 AM

## 2023-08-02 ENCOUNTER — HOSPITAL ENCOUNTER (OUTPATIENT)
Dept: PHYSICAL THERAPY | Age: 63
Setting detail: THERAPIES SERIES
Discharge: HOME | End: 2023-08-02
Payer: COMMERCIAL

## 2023-08-02 PROCEDURE — 97110 THERAPEUTIC EXERCISES: CPT

## 2023-08-02 PROCEDURE — 97016 VASOPNEUMATIC DEVICE THERAPY: CPT

## 2023-08-02 NOTE — FLOWSHEET NOTE
[x] [address]  46[address] [facility] [place] [facility].  [phone]  F: [phone] [] 204 [facility]  [address]   [address]  P: [phone]  F: [phone] [] [address]  151 [facility]  P: [phone]  F: [phone] [] [place]: [phone]  F: [phone] [] [address] [facility]  One [facility]   [address]   P: [phone]  F: [phone]  [] [address] [facility].   P: [phone]  F: [phone] [] 205 [facility]  [address] [facility]   [address]  P: [phone]  F: [phone] [] 224 [facility]  [address] [facility]  [place]: [phone]  F: [phone] [] [address] [place] [facility] [address]  [place]: [phone]  F: [phone]      Physical Therapy Daily Treatment Note    Date:  2023  Patient Name:  Odette Da Silva    :  1960  MRN: [id number]  Physician: Olimpia Carolina DO/ Kadie Wilson DO                                 Insurance: [facility] 24 visits 2-3x/week for 8 weeks -23  Medical Diagnosis: Bimalleolar ankle fracture, left, open type III, with routine healing, subsequent encounter (Y59.657R [ICD-10-CM])                    Rehab Codes: A42.307,L20.450, R26.2   Onset Date: Injury:3/7/2023                              Next 's appt: PRN  Visit# / total visits:     Cancels/No Shows: 0/1    Subjective:    Pain:  [x] Yes  [] No  Location: L ankle  Pain Rating: (0-10 scale) 0/10  Pain altered Tx:  [x] No  [] Yes  Action:  Comments: Patient with no

## 2023-08-02 NOTE — PRE-CERTIFICATION NOTE
[x] [address]  4600 [facility].  [phone]  F: [phone] [] 204 [facility]  [address]  P: [phone]  F: [phone] [] [address]  151 [facility]  P: [phone]  F: [phone] [] Raghu Kiddie: [phone]  F: [phone] [] 224 [facility]   [address]   P: [phone]  F: [phone]  [] [address] [facility].   P: [phone]  F: [phone] [] [address]  P: [phone]  F: [phone] [] 224 [facility]  [address] [facility]  [place]: [phone]  F: [phone] [] [address] [place] [address] Drive [address]  P: [phone]  F: Reece   1960   [id number]    8/2/2023    End date extended until 8/25/23.     Electronically signed by Ricardo Cheatham PT on 8/2/2023 at 11:35 AM

## 2023-08-04 ENCOUNTER — HOSPITAL ENCOUNTER (OUTPATIENT)
Dept: PHYSICAL THERAPY | Age: 63
Setting detail: THERAPIES SERIES
Discharge: HOME | End: 2023-08-04
Payer: COMMERCIAL

## 2023-08-04 PROCEDURE — 97110 THERAPEUTIC EXERCISES: CPT

## 2023-08-04 PROCEDURE — 97016 VASOPNEUMATIC DEVICE THERAPY: CPT

## 2023-08-04 NOTE — FLOWSHEET NOTE
[x] [address]  4600 [facility].  [phone]  F: [phone] [] 204 [facility]  [address]   [address]  P: [phone]  F: [phone] [] [address]  [address] [facility]  P: [phone]  F: [phone] [] [facility]: [phone]  F: [phone] [] 224 [facility]  One [facility]   [address]   P: [phone]  F: [phone]  [] 6[address] [facility].   P: [phone]  F: [phone] [] 205 [facility]  [address] [place]  [address]  P: [phone]  F: [phone] [] 224 [facility]  7[address] [facility]  [place]: [phone]  F: [phone] [] [address] [facility] [address]  [place]: [phone]  F: [phone]      Physical Therapy Daily Treatment Note    Date:  2023  Patient Name:  Deejay Gonzalez    :  1960  MRN: [id number]  Physician: Jah Gray DO/ Kenyatta Chi DO                                 Insurance: [facility] 24 visits 2-3x/week for 8 weeks -23End date extended until 23.   Medical Diagnosis: Bimalleolar ankle fracture, left, open type III, with routine healing, subsequent encounter (A28.317Z [ICD-10-CM])                    Rehab Codes: M25.572,M25.672, R26.2   Onset Date: Injury:3/7/2023                              Next 's appt: PRN  Visit# / total visits:     Cancels/No Shows: 0/1    Subjective:    Pain:  [x] Yes  [] No  Location: L ankle  Pain Rating: (0-10 scale) 3/10 medial knee   Pain altered Tx:  [x] No  []

## 2023-08-07 ENCOUNTER — HOSPITAL ENCOUNTER (OUTPATIENT)
Dept: PHYSICAL THERAPY | Age: 63
Setting detail: THERAPIES SERIES
Discharge: HOME | End: 2023-08-07
Payer: COMMERCIAL

## 2023-08-07 PROCEDURE — 97016 VASOPNEUMATIC DEVICE THERAPY: CPT

## 2023-08-07 PROCEDURE — 97110 THERAPEUTIC EXERCISES: CPT

## 2023-08-07 NOTE — FLOWSHEET NOTE
[x] [address]  4600 [facility] [place] [facility].  [phone]  F: [phone] [] 204 [facility]  [address]   [address]  P: [phone]  F: [phone] [] [address]  [address] [facility]  P: [phone]  F: [phone] [] [place]: [phone]  F: [phone] [] 224 [facility]  One [facility]   [address]   P: [phone]  F: [phone]  [] 0[address] [facility].   P: [phone]  F: [phone] [] 205 [facility]  [address] [place]  [address]  P: [phone]  F: [phone] [] 224 [facility]  [address] [facility]  [place]: [phone]  F: [phone] [] [address] [place] [facility] [address]  [place]: [phone]  F: [phone]      Physical Therapy Daily Treatment Note    Date:  2023  Patient Name:  Jasen Kumari    :  1960  MRN: [id number]  Physician: Fahad Owens DO/ Annmarie Ruiz DO                                 Insurance: [facility] 24 visits 2-3x/week for 8 weeks -23End date extended until 23.   Medical Diagnosis: Bimalleolar ankle fracture, left, open type III, with routine healing, subsequent encounter (R88.769Z [ICD-10-CM])                    Rehab Codes: M25.572,M25.672, R26.2   Onset Date: Injury:3/7/2023                              Next 's appt: PRN  Visit# / total visits:     Cancels/No Shows: 0/1    Subjective:    Pain:  [x] Yes  [] No  Location: L ankle  Pain Rating: (0-10 scale) 2-3/10   Pain altered Tx:  [x] No  [] Yes

## 2023-08-09 ENCOUNTER — HOSPITAL ENCOUNTER (OUTPATIENT)
Dept: PHYSICAL THERAPY | Age: 63
Setting detail: THERAPIES SERIES
Discharge: HOME | End: 2023-08-09
Payer: COMMERCIAL

## 2023-08-09 PROCEDURE — 97110 THERAPEUTIC EXERCISES: CPT

## 2023-08-09 PROCEDURE — 97016 VASOPNEUMATIC DEVICE THERAPY: CPT

## 2023-08-09 NOTE — FLOWSHEET NOTE
[x] [address]  4600 [facility].  [phone]  F: [phone] [] 204 [facility]  [address]   [address]  P: [phone]  F: [phone] [] [address]  151 [facility]  P: [phone]  F: [phone] [] [facility]: [phone]  F: [phone] [] 224 [facility]  One [facility]   [address]   P: [phone]  F: [phone]  [] [address] [facility].   P: [phone]  F: [phone] [] 205 [facility]  [address] [employer]  [address]  P: [phone]  F: [phone] [] 224 [facility]  [address] [facility]  [place]: [phone]  F: [phone] [] [address]  [place]: [phone]  F: [phone]      Physical Therapy Daily Treatment Note    Date:  2023  Patient Name:  Kimberly Layotn    :  1960  MRN: [id number]  Physician: Elizabeth Calderón DO/ Nabor Cabrera DO                                 Insurance: [facility] 24 visits 2-3x/week for 8 weeks -23End date extended until 23.   Medical Diagnosis: Bimalleolar ankle fracture, left, open type III, with routine healing, subsequent encounter (R76.382U [ICD-10-CM])                    Rehab Codes: M25.572,M25.672, R26.2   Onset Date: Injury:3/7/2023                              Next 's appt: PRN  Visit# / total visits:     Cancels/No Shows: 0/1    Subjective:    Pain:  [x] Yes  [] No  Location: L ankle  Pain Rating: (0-10 scale) 2-3/10   Pain altered Tx:  [x] No  [] Yes

## 2023-08-11 ENCOUNTER — HOSPITAL ENCOUNTER (OUTPATIENT)
Dept: PHYSICAL THERAPY | Age: 63
Setting detail: THERAPIES SERIES
Discharge: HOME | End: 2023-08-11
Payer: COMMERCIAL

## 2023-08-11 PROCEDURE — 97110 THERAPEUTIC EXERCISES: CPT

## 2023-08-11 PROCEDURE — 97016 VASOPNEUMATIC DEVICE THERAPY: CPT

## 2023-08-11 NOTE — FLOWSHEET NOTE
Activities      []  Neuro Re-ed      [x]  Vasocompression [phone]   [] Gait      []  Other      Total Treatment time 50 5    [id number]    Assessment: [x] Progressing toward goals:  Started with treadmill with both fwd and retro ambulation with non impressionable gait. Improved SLS balance noted with 20\" holds with no UE support. Patient noted soreness at end of treatment, ended with vaso compression to decrease pain and effects of DOMS. [] No change. [x] Other:  Encouraged patient to walk more at home to build up tolerance. [x] Patient would continue to benefit from skilled physical therapy services in order to: improve L ankle ROM, Strength, normalize gait and increase work tolerance    Problem list, as detailed above:   [x] ? Pain                       [x] ? ROM                      [x] ? Strength  [x] ? Flexibility:              [x] ? Function: LEFS 34% functional   [] ? Balance  [] Edema  [] Postural Deviations  [x] Gait Deviations  [] Other     STG: (to be met in 10 treatments)  ? Pain: 2/10 L ankle pain with ambulation. Ongoing, 5/10 along outside of foot  ? ROM:L ankle DF to 20 degrees to improve gait pattern. Not MET 2 degrees of DF  ? Strength: 5/5 L ankle all planes of motion to improve joint stability in standing. MET 5/5, except IV 4/5 with pain  ? Function:Patient is ambulating with normal gait pattern. Ongoing, carries in single crutch,   Independent with Home Exercise Programs MET, when he remembers to do them     LTG: (to be met in 24 treatments)  Patient is able to climb up and down 12 inch step to simulate stepping into truck/forklift. Return to work full duty. Patient goals: Walk as normal as possible.      Rehab Potential:  [x] Good  [] Fair  [] Poor              Suggested Professional Referral:  [x] No  [] Yes:  Barriers to Goal Achievement[de-identified]  [x] No  [] Yes:  Domestic Concerns:  [x] No  [] Yes:    Pt. Education:  [x] Yes  [] No  [x] Reviewed Prior HEP/Ed  Method of

## 2023-08-14 ENCOUNTER — HOSPITAL ENCOUNTER (OUTPATIENT)
Dept: PHYSICAL THERAPY | Age: 63
Setting detail: THERAPIES SERIES
Discharge: HOME | End: 2023-08-14
Payer: COMMERCIAL

## 2023-08-14 PROCEDURE — 97110 THERAPEUTIC EXERCISES: CPT

## 2023-08-14 PROCEDURE — 97016 VASOPNEUMATIC DEVICE THERAPY: CPT

## 2023-08-14 NOTE — FLOWSHEET NOTE
[x] [address]  4600 [facility].  [phone]  F: [phone] [] 204 [facility]  [address]   [address]  P: [phone]  F: [phone] [] [address]  151 [facility]  P: [phone]  F: [phone] [] [facility]: [phone]  F: [phone] [] 224 [facility]  One [facility]   [address]   P: [phone]  F: [phone]  [] [address] [facility].   P: [phone]  F: [phone] [] 205 [facility]  [address] [facility] [address]  P: [phone]  F: [phone] [] 224 [facility]  [address] [facility]  [place]: [phone]  F: [phone] [] [address] [place] [facility] [address]  [place]: [phone]  F: [phone]      Physical Therapy Daily Treatment Note    Date:  2023  Patient Name:  Mary Otero    :  1960  MRN: [id number]  Physician: Jonah Echavarria DO/ José Otto DO                                 Insurance: [facility] 24 visits 2-3x/week for 8 weeks -23 End date extended until 23.   Medical Diagnosis: Bimalleolar ankle fracture, left, open type III, with routine healing, subsequent encounter (T48.336W [ICD-10-CM])                    Rehab Codes: M25.572,M25.672, R26.2   Onset Date: Injury:3/7/2023                              Next 's appt: PRN  Visit# / total visits:     Cancels/No Shows: 0/1    Subjective:    Pain:  [] Yes  [x] No  Location: L ankle  Pain Rating: (0-10 scale) 0/10   Pain altered Tx:  [x] No  [] Yes

## 2023-08-16 ENCOUNTER — HOSPITAL ENCOUNTER (OUTPATIENT)
Dept: PHYSICAL THERAPY | Age: 63
Setting detail: THERAPIES SERIES
Discharge: HOME | End: 2023-08-16
Payer: COMMERCIAL

## 2023-08-16 PROCEDURE — 97110 THERAPEUTIC EXERCISES: CPT

## 2023-08-16 PROCEDURE — 97016 VASOPNEUMATIC DEVICE THERAPY: CPT

## 2023-08-16 NOTE — FLOWSHEET NOTE
[x] [address]  [address] [facility].  [phone]  F: [phone] [] 204 [facility]  [address]   [address]  P: [phone]  F: [phone] [] [address]  [address] [facility]  P: [phone]  F: [phone] [] [place]: [phone]  F: [phone] [] [address]  One [facility]   [address]   P: [phone]  F: [phone]  [] [address] [facility].   P: [phone]  F: [phone] [] 205 [facility]  [address] [place]  [address]  P: [phone]  F: [phone] [] 224 [facility]  [address] [facility]  [place]: [phone]  F: [phone] [] [address]  [place]: [phone]  F: [phone]      Physical Therapy Daily Treatment Note    Date:  2023  Patient Name:  Jasen Kumari    :  1960  MRN: [id number]  Physician: Fahad Owens DO/ Annmarie Ruiz DO                                 Insurance: [facility] 24 visits 2-3x/week for 8 weeks -23 End date extended until 23.   Medical Diagnosis: Bimalleolar ankle fracture, left, open type III, with routine healing, subsequent encounter (V60.675W [ICD-10-CM])                    Rehab Codes: M25.572,M25.672, R26.2   Onset Date: Injury:3/7/2023                              Next Dr.'s appt: PRN  Visit# / total visits:     Cancels/No Shows: 0/1    Subjective:    Pain:  [] Yes  [x] No  Location: L ankle  Pain Rating: (0-10 scale) 3/10   Pain altered Tx:  [x] No  [] Yes

## 2023-08-17 ENCOUNTER — TELEPHONE (OUTPATIENT)
Dept: ORTHOPEDIC SURGERY | Facility: CLINIC | Age: 63
End: 2023-08-17

## 2023-08-17 NOTE — TELEPHONE ENCOUNTER
Patient called to ask for an updated work note be sent to excuse until after he sees Dr. Gerry Casiano on 8/30. Patient is also asking for additional PT visits be submitted to [facility] as his has run out.

## 2023-08-18 ENCOUNTER — HOSPITAL ENCOUNTER (OUTPATIENT)
Dept: PHYSICAL THERAPY | Age: 63
Setting detail: THERAPIES SERIES
Discharge: HOME | End: 2023-08-18
Payer: COMMERCIAL

## 2023-08-18 PROCEDURE — 97116 GAIT TRAINING THERAPY: CPT

## 2023-08-18 PROCEDURE — 97016 VASOPNEUMATIC DEVICE THERAPY: CPT

## 2023-08-18 PROCEDURE — 97110 THERAPEUTIC EXERCISES: CPT

## 2023-08-18 NOTE — TELEPHONE ENCOUNTER
Spoke with Jose Ruby at [facility], she informed me pt has not been answering calls from their office and pt has not been in touch with his employer. She noted when she last spoke with the pt he knew he was released back to work as of 8/1/23.

## 2023-08-18 NOTE — TELEPHONE ENCOUNTER
Called patient. No ans. Left [facility] for patient to call back. Per last visit he ws to return to work on 8/1/2023. Need to know if he went back. Sharonda Zuniga at [facility] to see if she has spoke with patient recently. No answer. Left [facility].

## 2023-08-18 NOTE — FLOWSHEET NOTE
[x] [address]  4600 [facility] [place] [facility].  [phone]  F: [phone] [] 204 [facility]  [address]   [address]  P: [phone]  F: [phone] [] [address]  [address] [facility]  P: [phone]  F: [phone] [] [place]: [phone]  F: [phone] [] [address]  One [facility]   [address]   P: [phone]  F: [phone]  [] 7[address] [facility].   P: [phone]  F: [phone] [] [address] [facility]  [address] [place]  [address]  P: [phone]  F: [phone] [] [facility]  7[address] [facility]  [place]: [phone]  F: [phone] [] [address] [place] [facility] [address]  [place]: [phone]  F: [phone]      Physical Therapy Daily Treatment Note    Date:  2023  Patient Name:  Sully Cohen    :  1960  MRN: [id number]  Physician: Therese Chapin DO/ Josette Carolina DO                                 Insurance: [facility] 24 visits 2-3x/week for 8 weeks -23 End date extended until 23.   Medical Diagnosis: Bimalleolar ankle fracture, left, open type III, with routine healing, subsequent encounter (R51.757G [ICD-10-CM])                    Rehab Codes: M25.572,M25.672, R26.2   Onset Date: Injury:3/7/2023                              Next 's appt: 23  Visit# / total visits:     Cancels/No Shows: 0/1    Subjective:    Pain:  [] Yes  [x] No  Location: L ankle  Pain Rating: (0-10 scale) 3/10   Pain altered Tx:  [x] No  [] Yes

## 2023-08-21 ENCOUNTER — HOSPITAL ENCOUNTER (OUTPATIENT)
Dept: PHYSICAL THERAPY | Age: 63
Setting detail: THERAPIES SERIES
Discharge: HOME | End: 2023-08-21
Payer: COMMERCIAL

## 2023-08-21 PROCEDURE — 97110 THERAPEUTIC EXERCISES: CPT

## 2023-08-21 NOTE — FLOWSHEET NOTE
[x] [address]  4600 [facility].  [phone]  F: [phone] [] 204 [facility]  [address]   [address]  P: [phone]  F: [phone] [] [address]  151 [facility]  P: [phone]  F: [phone] [] [facility] [place]: [phone]  F: [phone] [] [address]  One [facility]   [address]   P: [phone]  F: [phone]  [] 8[address] [facility].   P: [phone]  F: [phone] [] 205 [facility]  [address] [place]  [address]  P: [phone]  F: [phone] [] 224 [facility]  795 [facility]  [place]: [phone]  F: [phone] [] [address] [place] [facility] [address]  [place]: [phone]  F: [phone]      Physical Therapy Daily Treatment Note    Date:  2023  Patient Name:  Damian Ugalde    :  1960  MRN: [id number]  Physician: Kevin Hills DO/ Salena Freeman DO                                 Insurance: [facility] 24 visits 2-3x/week for 8 weeks -23 End date extended until 23.   Medical Diagnosis: Bimalleolar ankle fracture, left, open type III, with routine healing, subsequent encounter (P01.944J [ICD-10-CM])                    Rehab Codes: M25.572,M25.672, R26.2   Onset Date: Injury:3/7/2023                              Next 's appt: 23  Visit# / total visits:     Cancels/No Shows: 0/1    Subjective:    Pain:  [] Yes  [x] No  Location: L ankle  Pain Rating: (0-10 scale) 3/10   Pain altered Tx:  [x] No  [] Yes

## 2023-08-21 NOTE — DISCHARGE SUMMARY
[x] [address]  4[address] [facility] [place].  [phone]  F: [phone] [] 204 [facility]  [address]   [address]  P: [phone]  F: [phone] [] [address]  151 [facility]  P: [phone]  F: [phone] [] [facility]: [phone]  F: [phone] [] [address] [facility]  One [facility]   [address]   P: [phone]  F: [phone]  [] 1417 [facility].   P: [phone]  F: [phone] [] 205 [facility]  [address]  P: [phone]  F: [phone] [] 224 [facility]  7[address] [facility]  [place]: [phone]  F: [phone] [] [address] [facility] [address]  [place]: [phone]  F: [phone]      Physical Therapy Discharge Note    Date: 2023      Patient: Roxana Nicholson  : 1960  MRN: [id number]    Physician: Cristina Millard DO/ Omega Chairez DO                                 Insurance: [facility] 24 visits 2-3x/week for 8 weeks -23 End date extended until 23.   Medical Diagnosis: Bimalleolar ankle fracture, left, open type III, with routine healing, subsequent encounter (O75.772O [ICD-10-CM])                    Rehab Codes: I75.437,A08.704, R26.2   Onset Date: Injury:3/7/2023                              Next Dr.'s appt: 23  Visit# / total visits:                     Cancels/No Shows: 0/1  Date of initial visit: 23                Date of final visit: 23    Subjective:    Pain:  [] Yes  [x] No

## 2023-08-30 ENCOUNTER — OFFICE VISIT (OUTPATIENT)
Dept: ORTHOPEDIC SURGERY | Age: 63
End: 2023-08-30

## 2023-08-30 VITALS — WEIGHT: 190 LBS | HEIGHT: 69 IN | BODY MASS INDEX: 28.14 KG/M2

## 2023-08-30 DIAGNOSIS — S82.842F BIMALLEOLAR ANKLE FRACTURE, LEFT, OPEN TYPE III, WITH ROUTINE HEALING, SUBSEQUENT ENCOUNTER: Primary | ICD-10-CM

## 2024-04-19 NOTE — PROGRESS NOTES
Diagnosis:   1. Anemia affecting pregnancy in second trimester        Patient arrived for Iron Sucrose infusion today.     Matt Lopez MD is the ordering clinician, therapy plan orders reviewed and signed by clinician.     Vital Signs:  ONC OP Encounter Vitals  BP: 112/71  Heart Rate: 97  Temp: 98.1 °F (36.7 °C)  SpO2: 100 %  Weight: 89.2 kg (196 lb 10.4 oz)      Allergies:  ALLERGIES:  No Known Allergies      Labs reviewed with the patient: N/A    Nursing Summary:  monitored patient during infusion and for 30 minutes post infusion.  Patient stable    Patient condition stable during treatment? Yes  Questions were answered and understanding was verbalized. Yes   Education provided Previously given    Patient advised on follow up, any and all questions answered.  Patient Discharged to home Ambulatory with self   fibular nail and syndesmotic suture button with no signs of failure or loosening. No acute fracture or dislocation. Impression:   Left bimalleolar fracture s/p ORIF. ASSESSMENT:  57 y.o. male with left bimalleolar fracture s/p ORIF    PLAN:  -Obtained 3 radiographic views of the left ankle and review with patient.  -Removed patient's splint and all the sutures from medial and lateral incision sites. Steristrips applied. Okay to shower, no soaks or tubs  -Dressed medial and lateral ankle with ABD pads and ace wrap. Placed in CAM walker boot. Educated him to do similar dressings for the next 4 weeks. He's to remain in the boot for 4 weeks, even while sleeping. His only time to remove the boot is to be for hygiene and when he's seated to do ROM exercises (emphasized spelling the alphabet with his foot, and to really work on active dorsi-/plantarflexion). -Patient will return in 4 weeks for re-evaluation. Return for re-evaluation of left ankle. No orders of the defined types were placed in this encounter.       Orders Placed This Encounter   Procedures    XR ANKLE LEFT (MIN 3 VIEWS)     Standing Status:   Future     Number of Occurrences:   1     Standing Expiration Date:   3/16/2024     Order Specific Question:   Reason for exam:     Answer:   monitor        Electronically signed by Marie Jeter DO on 3/22/2023 at 4:04 PM

## 2024-11-20 NOTE — PROGRESS NOTES
CIPHER OUTREACH    Patient states they accidentally pressed wrong button while answering CIPHER outreach. Patient denies further questions or concerns for writer currently.   Speech Language Pathology  [address]  Speech Language Pathology    SPEECH/COGNITIVE ASSESSMENT    NO LOC,CHI OR CVA/TIA - ST TO DEFER AT THIS TIME      Date: 3/7/2023  Patient Name: Mini Alonso  YOB: 1960   AGE: 57 y.o. MRN: [id number]        PT NOT SEEN FOR SPEECH OR COGNITIVE ASSESSMENT AT THIS TIME AS NO LOC, CHI OR CVA/TIA IS DOCUMENTED. ST TO DEFER AT THIS TIME. PLEASE RE-COSULT AS NEEDED. Elzbieta Gonzalez M.A.  CCC-SLP  3/7/2023  1:43 PM

## 2024-12-23 ENCOUNTER — HOSPITAL ENCOUNTER (EMERGENCY)
Age: 64
Discharge: HOME | End: 2024-12-23
Attending: STUDENT IN AN ORGANIZED HEALTH CARE EDUCATION/TRAINING PROGRAM
Payer: COMMERCIAL

## 2024-12-23 VITALS
SYSTOLIC BLOOD PRESSURE: 167 MMHG | RESPIRATION RATE: 16 BRPM | OXYGEN SATURATION: 100 % | HEART RATE: 80 BPM | DIASTOLIC BLOOD PRESSURE: 109 MMHG | TEMPERATURE: 98.2 F

## 2024-12-23 DIAGNOSIS — H57.12 LEFT EYE PAIN: Primary | ICD-10-CM

## 2024-12-23 PROCEDURE — 99283 EMERGENCY DEPT VISIT LOW MDM: CPT

## 2024-12-23 PROCEDURE — 6370000000 HC RX 637 (ALT 250 FOR IP)

## 2024-12-23 RX ADMIN — FLUORESCEIN SODIUM 1 MG: 1 STRIP OPHTHALMIC at 16:30

## 2024-12-23 ASSESSMENT — PAIN DESCRIPTION - LOCATION: LOCATION: EYE

## 2024-12-23 ASSESSMENT — PAIN SCALES - GENERAL: PAINLEVEL_OUTOF10: 2

## 2024-12-23 ASSESSMENT — PAIN - FUNCTIONAL ASSESSMENT: PAIN_FUNCTIONAL_ASSESSMENT: 0-10

## 2024-12-23 ASSESSMENT — PAIN DESCRIPTION - ORIENTATION: ORIENTATION: LEFT

## 2024-12-23 NOTE — DISCHARGE INSTRUCTIONS
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    Eye examination did not reveal any obvious foreign body, no corneal abrasion or injury.    You are given documents for work-related injury, schedule appointment with occupational health services.    Schedule appointment with your primary care physician or call [facility].    If you develop severe left eye pain associated with nausea, vomiting, headache, vision changes, focal weakness, come to emergency department

## 2024-12-23 NOTE — ED NOTES
Pt arrived to ED with report of foreign body in eye.   Pt states he was cleaning a window when he felt something hit his eye.   Pt states the window was kim and that he was using a  to clean the window.   Pt states he did flush his eyes after and had improvement in pain.   Pt reports mild discomfort currently rated 2/10  Pt denies any changes in vision, pt A&Ox4, RR even/unlabored, call light within reach

## 2024-12-26 ASSESSMENT — ENCOUNTER SYMPTOMS
VOMITING: 0
SHORTNESS OF BREATH: 0
NAUSEA: 0

## 2024-12-26 NOTE — ED PROVIDER NOTES
[facility]     Emergency Department     Faculty Attestation    I performed a history and physical examination of the patient and discussed management with the resident. I have reviewed and agree with the resident’s findings including all diagnostic interpretations, and treatment plans as written at the time of my review. Any areas of disagreement are noted on the chart. I was personally present for the key portions of any procedures. I have documented in the chart those procedures where I was not present during the key portions. For Physician Assistant/ Nurse Practitioner cases/documentation I have personally evaluated this patient and have completed at least one if not all key elements of the E/M (history, physical exam, and MDM). Additional findings are as noted.    PtName: Irineo Toribio  MRN: [id number]  Birthdate 1960  Date of evaluation: 12/23/24  Note Started: 3:46 PM EST    Primary Care Physician: No primary care provider on file.    Brief HPI:  63-year-old male presents emergency department with foreign body sensation in left eye    Pertinent Physical Exam Findings:  Vitals:    12/23/24 1518   BP: (!) 167/109   Pulse: 80   Resp: 16   Temp: 98.2 °F (36.8 °C)   SpO2: 100%   Under slit-lamp exam no obvious foreign body was visualized.  Conjunctival injection.  Pupils equal round reactive to light.  Visual acuity is baseline for the patient.    Medical Decision Making: Patient is a 63 y.o. male presenting to the emergency department with foreign body sensation in the left eye. The chart was reviewed for pertinent history relating to the chief complaint.  No obvious foreign body in slit-lamp exam.  Fluorescein stain exam per resident note.    All results, including labs (if ordered), imaging (if ordered), and EKGs (if ordered) were independently interpreted by me.  See radiologist report for additional details on imaging studies.      (Please note that 
  Reassurance and discharge with work documents.     Risk  Prescription drug management.        EKG  Not clinically indicated     All EKG's are interpreted by the Emergency Department Physician who either signs or Co-signs this chart in the absence of a cardiologist.    EMERGENCY DEPARTMENT COURSE:    ED Course as of 12/26/24 1628   Mon Dec 23, 2024   1540 I have seen the patient, there is no clear FB under slit lamp exam, over the right upper outer quadrant area there is corneal irregularity, looks like anatomical variation or defect rather FB or ring rust.  [TS]      ED Course User Index  [TS] Glenn Stark MD       PROCEDURES:  Fluorecin exanimation -- No corneal abrasion appreciated.   Slit lamp exam - No rust ring or FB seen      CONSULTS:  None    CRITICAL CARE:  There was significant risk of life threatening deterioration of patient's condition requiring my direct management. Critical care time 0 minutes, excluding any documented procedures.    FINAL IMPRESSION      1. Left eye pain          DISPOSITION / PLAN     DISPOSITION Decision To Discharge 12/23/2024 05:02:57 PM   DISPOSITION CONDITION Stable           PATIENT REFERRED TO:  [facility] Emergency Department  [address] [facility] [id number] [phone]  Go to   If you develop severe left eye pain associated with nausea, vomiting, headache, vision changes, focal weakness, come to emergency department    Your PCP      Call to make follow-up    [facility]  [address] [place] [id number] [phone]  Call   Call to make follow-up    [facility]  [address] [facility] [id number] [phone]  Schedule an appointment as soon as possible for a visit in 2 days  Call to make follow-up    Occupational health clinic  [phone]  Call   Call to make follow-up      DISCHARGE MEDICATIONS:  Discharge Medication List as of 12/23/2024  5:06 PM          Glenn Stark MD  Emergency Medicine

## (undated) DEVICE — ZIMMER® STERILE DISPOSABLE TOURNIQUET CUFF WITH PROTECTIVE SLEEVE AND PLC, DUAL PORT, SINGLE BLADDER, 34 IN. (86 CM)

## (undated) DEVICE — DRESSING,GAUZE,XEROFORM,CURAD,1"X8",ST: Brand: CURAD

## (undated) DEVICE — PADDING,UNDERCAST,COTTON, 4"X4YD STERILE: Brand: MEDLINE

## (undated) DEVICE — GOWN,AURORA,NONREINFORCED,LARGE: Brand: MEDLINE

## (undated) DEVICE — INTRODUCER SHTH 6FR L12CM DIA0.038IN HEMSTAS CLOSE TOL

## (undated) DEVICE — GLOVE ORANGE PI 8   MSG9080

## (undated) DEVICE — SURGICAL SUCTION CONNECTING TUBE WITH MALE CONNECTOR AND SUCTION CLAMP, 2 FT. LONG (.6 M), 5 MM I.D.: Brand: CONMED

## (undated) DEVICE — GOWN,SIRUS,NONRNF,SETINSLV,XL,20/CS: Brand: MEDLINE

## (undated) DEVICE — SURGICAL PROCEDURE TRAY CRD CATH SVMMC

## (undated) DEVICE — GLOVE ORANGE PI 7 1/2   MSG9075

## (undated) DEVICE — SLEEVE CATH L60CM INTRO PRSSURE REPOS W/ HEMSTAS VLV PRSS

## (undated) DEVICE — SUTURE ETHLN SZ 2-0 L18IN NONABSORBABLE BLK L26MM PS 3/8 585H

## (undated) DEVICE — APPLICATOR MEDICATED 10.5 CC SOLUTION SCRB TEAL CHLORAPREP

## (undated) DEVICE — SVMMC ORTH SPL DRP PK

## (undated) DEVICE — INTENDED FOR TISSUE SEPARATION, AND OTHER PROCEDURES THAT REQUIRE A SHARP SURGICAL BLADE TO PUNCTURE OR CUT.: Brand: BARD-PARKER ® CARBON RIB-BACK BLADES

## (undated) DEVICE — C-ARMOR C-ARM EQUIPMENT COVERS CLEAR STERILE UNIVERSAL FIT 12 PER CASE: Brand: C-ARMOR

## (undated) DEVICE — FIBULOCK IMPL SYS STRL

## (undated) DEVICE — Device

## (undated) DEVICE — SUTURE PERMA-HAND SZ 3-0 L18IN NONABSORBABLE BLK L24MM PS-1 1684G

## (undated) DEVICE — BANDAGE,GAUZE,BULKEE II,4.5"X4.1YD,STRL: Brand: MEDLINE

## (undated) DEVICE — BIT DRL DIA2MM CALIB FOR ANK FRAC MGMT SYS

## (undated) DEVICE — SCREW BNE L14MM DIA2.7MM CORT ANK S STL NONLOCKING LO PROF: Type: IMPLANTABLE DEVICE | Site: ANKLE | Status: NON-FUNCTIONAL

## (undated) DEVICE — PADDING CAST W6INXL4YD COT LO LINTING WYTEX

## (undated) DEVICE — APPLICATOR MEDICATED 26 CC SOLUTION HI LT ORNG CHLORAPREP

## (undated) DEVICE — Device: Brand: NOMOLINE™ LH ADULT NASAL/ORAL CO2 CANNULA WITH O2 4M

## (undated) DEVICE — GLOVE ORANGE PI 8 1/2   MSG9085

## (undated) DEVICE — MEDI-TRACE CADENCE ADULT, DEFIBRILLATION ELECTRODE -RTS  (10 PR/PK) - PHYSIO-CONTROL: Brand: MEDI-TRACE CADENCE

## (undated) DEVICE — GUIDEWIRE ORTH DIA0.045IN W/ DBL TRCR TIP LSR MRK DISP FOR

## (undated) DEVICE — CYSTO/BLADDER IRRIGATION SET, REGULATING CLAMP

## (undated) DEVICE — ELECTRODE,ECG,STRESS,FOAM,5PK: Brand: MEDLINE

## (undated) DEVICE — ANGIOGRAPHY KIT MANIFOLD UPSIDE DN

## (undated) DEVICE — BIT DRL DIA2.7MM STR CANN FOR MINI COMPR FULL THRD SCR

## (undated) DEVICE — BNDG,ELSTC,MATRIX,STRL,4"X5YD,LF,HOOK&LP: Brand: MEDLINE

## (undated) DEVICE — ELECTRODE PT RET AD L9FT HI MOIST COND ADH HYDRGEL CORDED

## (undated) DEVICE — GAUZE,SPONGE,FLUFF,6"X6.75",STRL,5/TRAY: Brand: MEDLINE

## (undated) DEVICE — SUTURE MCRYL SZ 2-0 L27IN ABSRB UD SH L26MM TAPERPOINT NDL Y417H

## (undated) DEVICE — GUIDEWIRE ORTH L150MM DIA0.053IN W/ TRCR TIP FOR ANK FRAC

## (undated) DEVICE — THE STERILE LIGHT HANDLE COVER IS USED WITH STERIS SURGICAL LIGHTING AND VISUALIZATION SYSTEMS.

## (undated) DEVICE — DRESSING TRNSPAR W5XL4.5IN FLM SHT SEMIPERMEABLE WIND

## (undated) DEVICE — CAP TBNG ACC CHEMO SFTY LUER FEM OR M TEXIUM

## (undated) DEVICE — SPLINT CAST W5XL30IN GRN STRENGTH PLSTR OF PARIS FAST SET

## (undated) DEVICE — BIT DRL 3.7 MM NS DISP

## (undated) DEVICE — BNDG,ELSTC,MATRIX,STRL,6"X5YD,LF,HOOK&LP: Brand: MEDLINE

## (undated) DEVICE — APPLICATOR MEDICATED 10.5 CC SOLUTION HI LT ORNG CHLORAPREP

## (undated) DEVICE — SUTURE PDS II SZ 0 L27IN ABSRB VLT L36MM CT-1 1/2 CIR Z340H

## (undated) DEVICE — GUIDEWIRE ORTH DIA0.045IN W/ TRCR TIP LSR LN

## (undated) DEVICE — DECANTER BAG 9": Brand: MEDLINE INDUSTRIES, INC.

## (undated) DEVICE — SOLUTION SCRB 4OZ 4% CHG H2O AIDED FOR PREOPERATIVE SKIN

## (undated) DEVICE — TOWEL,OR,DSP,ST,BLUE,DLX,XR,4/PK,20PK/CS: Brand: MEDLINE

## (undated) DEVICE — GLOVE SURG SZ 65 THK91MIL LTX FREE SYN POLYISOPRENE

## (undated) DEVICE — DRAPE,U/ SHT,SPLIT,PLAS,STERIL: Brand: MEDLINE

## (undated) DEVICE — KIT MICRO INTRO 4FR STIFF 40CM NIGHTENALL TUNGSTEN 7SMT